# Patient Record
Sex: MALE | Race: WHITE | NOT HISPANIC OR LATINO | Employment: FULL TIME | URBAN - METROPOLITAN AREA
[De-identification: names, ages, dates, MRNs, and addresses within clinical notes are randomized per-mention and may not be internally consistent; named-entity substitution may affect disease eponyms.]

---

## 2023-03-30 RX ORDER — DEXTROAMPHETAMINE SACCHARATE, AMPHETAMINE ASPARTATE, DEXTROAMPHETAMINE SULFATE AND AMPHETAMINE SULFATE 7.5; 7.5; 7.5; 7.5 MG/1; MG/1; MG/1; MG/1
30 TABLET ORAL DAILY
COMMUNITY
Start: 2023-03-03 | End: 2023-04-27 | Stop reason: SDUPTHER

## 2023-03-30 RX ORDER — DEXTROAMPHETAMINE SACCHARATE, AMPHETAMINE ASPARTATE, DEXTROAMPHETAMINE SULFATE AND AMPHETAMINE SULFATE 3.75; 3.75; 3.75; 3.75 MG/1; MG/1; MG/1; MG/1
15 TABLET ORAL DAILY
COMMUNITY
End: 2023-04-27 | Stop reason: SDUPTHER

## 2023-04-27 DIAGNOSIS — F98.8 ATTENTION DEFICIT DISORDER, UNSPECIFIED HYPERACTIVITY PRESENCE: ICD-10-CM

## 2023-04-27 RX ORDER — DEXTROAMPHETAMINE SACCHARATE, AMPHETAMINE ASPARTATE, DEXTROAMPHETAMINE SULFATE AND AMPHETAMINE SULFATE 3.75; 3.75; 3.75; 3.75 MG/1; MG/1; MG/1; MG/1
15 TABLET ORAL DAILY
Qty: 30 TABLET | Refills: 0 | Status: SHIPPED | OUTPATIENT
Start: 2023-04-27 | End: 2023-05-04 | Stop reason: SDUPTHER

## 2023-04-27 RX ORDER — DEXTROAMPHETAMINE SACCHARATE, AMPHETAMINE ASPARTATE, DEXTROAMPHETAMINE SULFATE AND AMPHETAMINE SULFATE 7.5; 7.5; 7.5; 7.5 MG/1; MG/1; MG/1; MG/1
30 TABLET ORAL DAILY
Qty: 30 TABLET | Refills: 0 | Status: SHIPPED | OUTPATIENT
Start: 2023-04-27 | End: 2023-05-04 | Stop reason: SDUPTHER

## 2023-05-04 ENCOUNTER — LAB (OUTPATIENT)
Dept: LAB | Facility: LAB | Age: 58
End: 2023-05-04
Payer: COMMERCIAL

## 2023-05-04 ENCOUNTER — OFFICE VISIT (OUTPATIENT)
Dept: PRIMARY CARE | Facility: CLINIC | Age: 58
End: 2023-05-04
Payer: COMMERCIAL

## 2023-05-04 VITALS
HEART RATE: 82 BPM | BODY MASS INDEX: 33.96 KG/M2 | HEIGHT: 71 IN | DIASTOLIC BLOOD PRESSURE: 80 MMHG | SYSTOLIC BLOOD PRESSURE: 128 MMHG | WEIGHT: 242.6 LBS

## 2023-05-04 DIAGNOSIS — F98.8 ATTENTION DEFICIT DISORDER, UNSPECIFIED HYPERACTIVITY PRESENCE: ICD-10-CM

## 2023-05-04 DIAGNOSIS — F98.8 ATTENTION DEFICIT DISORDER (ADD) IN ADULT: Primary | ICD-10-CM

## 2023-05-04 DIAGNOSIS — F98.8 ATTENTION DEFICIT DISORDER (ADD) IN ADULT: ICD-10-CM

## 2023-05-04 PROCEDURE — 1036F TOBACCO NON-USER: CPT | Performed by: STUDENT IN AN ORGANIZED HEALTH CARE EDUCATION/TRAINING PROGRAM

## 2023-05-04 PROCEDURE — 99213 OFFICE O/P EST LOW 20 MIN: CPT | Performed by: STUDENT IN AN ORGANIZED HEALTH CARE EDUCATION/TRAINING PROGRAM

## 2023-05-04 PROCEDURE — 80324 DRUG SCREEN AMPHETAMINES 1/2: CPT

## 2023-05-04 PROCEDURE — 80307 DRUG TEST PRSMV CHEM ANLYZR: CPT

## 2023-05-04 RX ORDER — DEXTROAMPHETAMINE SACCHARATE, AMPHETAMINE ASPARTATE, DEXTROAMPHETAMINE SULFATE AND AMPHETAMINE SULFATE 7.5; 7.5; 7.5; 7.5 MG/1; MG/1; MG/1; MG/1
30 TABLET ORAL DAILY
Qty: 30 TABLET | Refills: 0 | Status: SHIPPED | OUTPATIENT
Start: 2023-05-04 | End: 2023-08-04 | Stop reason: SDUPTHER

## 2023-05-04 RX ORDER — DEXTROAMPHETAMINE SACCHARATE, AMPHETAMINE ASPARTATE, DEXTROAMPHETAMINE SULFATE AND AMPHETAMINE SULFATE 7.5; 7.5; 7.5; 7.5 MG/1; MG/1; MG/1; MG/1
30 TABLET ORAL DAILY
Qty: 30 TABLET | Refills: 0 | Status: SHIPPED | OUTPATIENT
Start: 2023-06-13 | End: 2023-08-04 | Stop reason: SDUPTHER

## 2023-05-04 RX ORDER — DEXTROAMPHETAMINE SACCHARATE, AMPHETAMINE ASPARTATE, DEXTROAMPHETAMINE SULFATE AND AMPHETAMINE SULFATE 3.75; 3.75; 3.75; 3.75 MG/1; MG/1; MG/1; MG/1
15 TABLET ORAL DAILY
Qty: 30 TABLET | Refills: 0 | Status: SHIPPED | OUTPATIENT
Start: 2023-05-04 | End: 2023-05-31 | Stop reason: SDUPTHER

## 2023-05-04 RX ORDER — DEXTROAMPHETAMINE SACCHARATE, AMPHETAMINE ASPARTATE, DEXTROAMPHETAMINE SULFATE AND AMPHETAMINE SULFATE 3.75; 3.75; 3.75; 3.75 MG/1; MG/1; MG/1; MG/1
15 TABLET ORAL DAILY
Qty: 30 TABLET | Refills: 0 | Status: SHIPPED | OUTPATIENT
Start: 2023-06-13 | End: 2023-08-04 | Stop reason: SDUPTHER

## 2023-05-04 RX ORDER — DEXTROAMPHETAMINE SACCHARATE, AMPHETAMINE ASPARTATE, DEXTROAMPHETAMINE SULFATE AND AMPHETAMINE SULFATE 7.5; 7.5; 7.5; 7.5 MG/1; MG/1; MG/1; MG/1
30 TABLET ORAL DAILY
Qty: 30 TABLET | Refills: 0 | Status: SHIPPED | OUTPATIENT
Start: 2023-07-13 | End: 2023-08-04 | Stop reason: SDUPTHER

## 2023-05-04 RX ORDER — DEXTROAMPHETAMINE SACCHARATE, AMPHETAMINE ASPARTATE, DEXTROAMPHETAMINE SULFATE AND AMPHETAMINE SULFATE 3.75; 3.75; 3.75; 3.75 MG/1; MG/1; MG/1; MG/1
15 TABLET ORAL DAILY
Qty: 30 TABLET | Refills: 0 | Status: SHIPPED | OUTPATIENT
Start: 2023-07-13 | End: 2023-08-04 | Stop reason: SDUPTHER

## 2023-05-04 ASSESSMENT — PATIENT HEALTH QUESTIONNAIRE - PHQ9
2. FEELING DOWN, DEPRESSED OR HOPELESS: NOT AT ALL
1. LITTLE INTEREST OR PLEASURE IN DOING THINGS: NOT AT ALL
SUM OF ALL RESPONSES TO PHQ9 QUESTIONS 1 AND 2: 0

## 2023-05-04 NOTE — PROGRESS NOTES
"Subjective   Patient ID: Raphael Cristina is a 57 y.o. male who presents for ADHD.    HPI   Patient is presenting to the office today to follow-up for his ADHD    He continues to take Adderall 30 mg in the morning and 15 mg in the afternoon    Overall he feels very well with this current medication regiment    His last UDS/CSA was updated in the summer 2022 and he is willing to update at today's visit    Requesting refills to be sent to his local pharmacy    Review of Systems  All pertinent positive symptoms are included in the history of present illness.    All other systems have been reviewed and are negative and noncontributory to this patient's current ailments.    Objective   /80 (BP Location: Left arm, Patient Position: Sitting, BP Cuff Size: Large adult)   Pulse 82   Ht 1.803 m (5' 11\")   Wt 110 kg (242 lb 9.6 oz)   BMI 33.84 kg/m²     Physical Exam  CONSTITUTIONAL - well nourished, well developed, looks like stated age, in no acute distress, not ill-appearing, and not tired appearing  SKIN - normal skin color and pigmentation, normal skin turgor without rash, lesions, or nodules visualized  HEAD - no trauma, normocephalic  EYES - normal external exam  CHEST -no distressed breathing, good effort, heart regular rate and rhythm, no murmurs, rubs, or gallops, lungs clear to auscultation bilaterally  EXTREMITIES - no edema, no deformities  NEUROLOGICAL - normal balance, normal motor, no ataxia  PSYCHIATRIC - alert, pleasant and cordial, age-appropriate    Assessment/Plan   1.  ADHD    Stable.  No changes recommended at this time  We will continue 30 mg in the morning and 15 mg the afternoon    UDS/CSA updated at today's visit    Please follow-up in 3 months for continued care and refills  "

## 2023-05-05 LAB
AMPHETAMINE (PRESENCE) IN URINE BY SCREEN METHOD: ABNORMAL
BARBITURATES PRESENCE IN URINE BY SCREEN METHOD: ABNORMAL
BENZODIAZEPINE (PRESENCE) IN URINE BY SCREEN METHOD: ABNORMAL
CANNABINOIDS IN URINE BY SCREEN METHOD: ABNORMAL
COCAINE (PRESENCE) IN URINE BY SCREEN METHOD: ABNORMAL
DRUG SCREEN COMMENT URINE: ABNORMAL
FENTANYL URINE: ABNORMAL
METHADONE (PRESENCE) IN URINE BY SCREEN METHOD: ABNORMAL
OPIATES (PRESENCE) IN URINE BY SCREEN METHOD: ABNORMAL
OXYCODONE (PRESENCE) IN URINE BY SCREEN METHOD: ABNORMAL
PHENCYCLIDINE (PRESENCE) IN URINE BY SCREEN METHOD: ABNORMAL

## 2023-05-09 LAB
AMPHETAMINES,URINE: 2726 NG/ML
MDA,URINE: <200 NG/ML
MDEA,URINE: <200 NG/ML
MDMA,UR: <200 NG/ML
METHAMPHETAMINE QUANTITATIVE URINE: <200 NG/ML
PHENTERMINE,UR: <200 NG/ML

## 2023-05-31 DIAGNOSIS — F98.8 ATTENTION DEFICIT DISORDER (ADD) IN ADULT: ICD-10-CM

## 2023-05-31 DIAGNOSIS — F98.8 ATTENTION DEFICIT DISORDER, UNSPECIFIED HYPERACTIVITY PRESENCE: ICD-10-CM

## 2023-05-31 RX ORDER — DEXTROAMPHETAMINE SACCHARATE, AMPHETAMINE ASPARTATE, DEXTROAMPHETAMINE SULFATE AND AMPHETAMINE SULFATE 3.75; 3.75; 3.75; 3.75 MG/1; MG/1; MG/1; MG/1
15 TABLET ORAL DAILY
Qty: 30 TABLET | Refills: 0 | Status: SHIPPED | OUTPATIENT
Start: 2023-05-31 | End: 2023-08-04 | Stop reason: SDUPTHER

## 2023-08-03 NOTE — PROGRESS NOTES
"Subjective   Patient ID: Raphael Cristina is a 57 y.o. male who presents for ADHD.  Today he is accompanied by alone.     HPI    1. Attention deficit disorder, unspecified hyperactivity presence  Controlled, taking Adderall 30 mg once daily in AM and 15 mg once daily in afternoon.  Reports increased tolerance to current dosage.   UDS completed last visit (5/4/2023).   CSA to be signed in clinic today.   Denies chest pain, shortness of breath, palpitations, or dizziness.     Current Outpatient Medications on File Prior to Visit   Medication Sig Dispense Refill    amphetamine-dextroamphetamine (Adderall) 15 mg tablet Take 1 tablet (15 mg) by mouth once daily. Do not start before June 13, 2023. 30 tablet 0    amphetamine-dextroamphetamine (Adderall) 15 mg tablet Take 1 tablet (15 mg) by mouth once daily. Do not start before July 13, 2023. 30 tablet 0    amphetamine-dextroamphetamine (Adderall) 15 mg tablet Take 1 tablet (15 mg) by mouth once daily. 30 tablet 0    amphetamine-dextroamphetamine (Adderall) 30 mg tablet Take 1 tablet (30 mg) by mouth once daily. as directed 30 tablet 0    amphetamine-dextroamphetamine (Adderall) 30 mg tablet Take 1 tablet (30 mg) by mouth once daily. as directed Do not start before June 13, 2023. 30 tablet 0    amphetamine-dextroamphetamine (Adderall) 30 mg tablet Take 1 tablet (30 mg) by mouth once daily. as directed Do not start before July 13, 2023. 30 tablet 0     No current facility-administered medications on file prior to visit.        No Known Allergies      There is no immunization history on file for this patient.      Review of Systems  All pertinent positive symptoms are included in the history of present illness.  All other systems have been reviewed and are negative and noncontributory to this patient's current ailments.     Objective   /84 (BP Location: Left arm, Patient Position: Sitting, BP Cuff Size: Adult)   Pulse 84   Ht 1.803 m (5' 11\")   Wt 109 kg (240 lb)   " BMI 33.47 kg/m²   BSA: 2.34 meters squared  No visits with results within 1 Month(s) from this visit.   Latest known visit with results is:   Lab on 05/04/2023   Component Date Value Ref Range Status    DRUG SCREEN COMMENT URINE 05/04/2023 SEE BELOW   Final    Drug screen results are presumptive and should not be used to assess   compliance with prescribed medication. Definitive confirmatory drug testing   has been added to this sample for any positive screen result and will be   reported separately.   .  Toxicology screening results are reported qualitatively. The concentration   must be greater than or equal to the cutoff to be reported as positive. The   concentration at which the screening test can detect an individual drug or   metabolite varies. The absence of expected drug(s) and/or drug metabolite(s)   may indicate non-compliance, inappropriate timing of specimen collection   relative to drug administration, poor drug absorption, diluted/adulterated   urine, or limitations of testing. For medical purposes only; not valid for   forensic use.   .  Interpretive questions should be directed to the laboratory medical   directors.      Amphetamine Screen, Urine 05/04/2023 PRESUMPTIVE POSITIVE (A)  NEGATIVE Final     CUTOFF LEVEL:  500 NG/ML   Cross-reactivity has been reported with high concentrations   of the following drugs: buproprion, chloroquine, chlorpromazine,   ephedrine, mephentermine, fenfluramine, phentermine,   phenylpropanolamine, pseudoephedrine, and propranolol.    Barbiturate Screen, Urine 05/04/2023 PRESUMPTIVE NEGATIVE  NEGATIVE Final     CUTOFF LEVEL: 200 NG/ML    BENZODIAZEPINE (PRESENCE) IN URINE* 05/04/2023 PRESUMPTIVE NEGATIVE  NEGATIVE Final     CUTOFF LEVEL: 200 NG/ML    Cannabinoid Screen, Urine 05/04/2023 PRESUMPTIVE NEGATIVE  NEGATIVE Final     CUTOFF LEVEL: 50 NG/ML    Cocaine Screen, Urine 05/04/2023 PRESUMPTIVE NEGATIVE  NEGATIVE Final     CUTOFF LEVEL: 150 NG/ML    Fentanyl, Ur  05/04/2023 PRESUMPTIVE NEGATIVE  NEGATIVE Final     CUTOFF LEVEL:  5 NG/ML    Methadone Screen, Urine 05/04/2023 PRESUMPTIVE NEGATIVE  NEGATIVE Final     CUTOFF LEVEL: 150 NG/ML   The metabolite L-alpha-acetylmethadol (LAAM) is not   detected by this method in concentrations that would   be found in the urine of patients on LAAM therapy.    Opiate Screen, Urine 05/04/2023 PRESUMPTIVE NEGATIVE  NEGATIVE Final     CUTOFF LEVEL: 300 NG/ML   The opiate screen does not detect fentanyl, meperidine, or    tramadol. Oxycodone is not consistently detected (refer to   Oxycodone Screen, Urine result).    Oxycodone Screen, Ur 05/04/2023 PRESUMPTIVE NEGATIVE  NEGATIVE Final     CUTOFF LEVEL: 100 NG/ML    This test will accurately detect both oxycodone and oxymorphone.         PCP Screen, Urine 05/04/2023 PRESUMPTIVE NEGATIVE  NEGATIVE Final     CUTOFF LEVEL:  25 NG/ML   Cross-reactivity has been reported with dextromethorphan.    Methamphetamine Quant, Ur 05/04/2023 <200  ng/mL Final    MDA, Urine 05/04/2023 <200  ng/mL Final    MDEA, Urine 05/04/2023 <200  ng/mL Final    Phentermine,Urine 05/04/2023 <200  ng/mL Final    Performed By: [facility]  [address] [place] [id number]  : Tyrone Carvalho MD, PhD    Amphetamines,Urine 05/04/2023 2726  ng/mL Final    Comment: Consistent with use of a drug containing amphetamine. May also   reflect metabolism of methamphetamine, when methamphetamine is   present.  Amphetamine and methamphetamine exist in d- and   l-isomeric forms.  These forms are not distinguished by this test.    Isomeric separation is available separately for an additional   charge.  INTERPRETIVE INFORMATION: Amphetamines, Urine,                             Quantitative  Methodology: Quantitative Liquid Chromatography-Tandem Mass   Spectrometry  Positive cutoff: 200 ng/mL unless specified below:  Amphetamine     50 ng/mL  For medical purposes only; not valid for forensic use.    The absence of expected drug(s) and/or drug metabolite(s) may   indicate non-compliance, inappropriate timing of specimen   collection relative to drug administration, poor drug absorption,   diluted/adulterated urine, or limitations of testing. The   concentration value must be greater than or equal to the cutoff to   be reported as positive. Interpretive                            questions should be directed   to the laboratory.  This test was developed and its performance characteristics   determined by [facility]. It has not been cleared or   approved by the [place] Food and Drug Administration. This test was   performed in a CLIA certified laboratory and is intended for   clinical purposes.    MDMA, Urine 05/04/2023 <200  ng/mL Final       Physical Exam  CONSTITUTIONAL - well nourished, well developed, looks like stated age, in no acute distress, not ill-appearing, and not tired appearing  SKIN - normal skin color and pigmentation, normal skin turgor without rash, lesions, or nodules visualized  HEAD - no trauma, normocephalic  EYES - normal external exam  CHEST - clear to auscultation, no wheezing, no crackles and no rales, good effort  CARDIAC - regular rate and regular rhythm, no skipped beats, no murmur  EXTREMITIES - no edema, no deformities  NEUROLOGICAL - normal balance, normal motor, no ataxia  PSYCHIATRIC - alert, pleasant and cordial, age-appropriate    Assessment/Plan   1. Attention deficit disorder, unspecified hyperactivity presence  Controlled, refills of both your Adderall 30 mg QAM and 15 mg QPM sent to pharmacy.   Discussed cardiovascular risk with increasing stimulant dosage.   Also, discussed alternative treatment options including switching to Vyvanse.   UDS completed last visit (5/4/2023).   CSA reviewed and signed today (8/4/2023).

## 2023-08-04 ENCOUNTER — OFFICE VISIT (OUTPATIENT)
Dept: PRIMARY CARE | Facility: CLINIC | Age: 58
End: 2023-08-04
Payer: COMMERCIAL

## 2023-08-04 VITALS
BODY MASS INDEX: 33.6 KG/M2 | HEART RATE: 84 BPM | HEIGHT: 71 IN | SYSTOLIC BLOOD PRESSURE: 122 MMHG | DIASTOLIC BLOOD PRESSURE: 84 MMHG | WEIGHT: 240 LBS

## 2023-08-04 DIAGNOSIS — F98.8 ATTENTION DEFICIT DISORDER, UNSPECIFIED HYPERACTIVITY PRESENCE: Primary | ICD-10-CM

## 2023-08-04 DIAGNOSIS — F98.8 ATTENTION DEFICIT DISORDER (ADD) IN ADULT: ICD-10-CM

## 2023-08-04 PROCEDURE — 1036F TOBACCO NON-USER: CPT | Performed by: STUDENT IN AN ORGANIZED HEALTH CARE EDUCATION/TRAINING PROGRAM

## 2023-08-04 PROCEDURE — 99213 OFFICE O/P EST LOW 20 MIN: CPT | Performed by: STUDENT IN AN ORGANIZED HEALTH CARE EDUCATION/TRAINING PROGRAM

## 2023-08-04 RX ORDER — DEXTROAMPHETAMINE SACCHARATE, AMPHETAMINE ASPARTATE, DEXTROAMPHETAMINE SULFATE AND AMPHETAMINE SULFATE 3.75; 3.75; 3.75; 3.75 MG/1; MG/1; MG/1; MG/1
15 TABLET ORAL DAILY
Qty: 30 TABLET | Refills: 0 | Status: SHIPPED | OUTPATIENT
Start: 2023-09-23 | End: 2023-11-02 | Stop reason: SDUPTHER

## 2023-08-04 RX ORDER — DEXTROAMPHETAMINE SACCHARATE, AMPHETAMINE ASPARTATE, DEXTROAMPHETAMINE SULFATE AND AMPHETAMINE SULFATE 7.5; 7.5; 7.5; 7.5 MG/1; MG/1; MG/1; MG/1
30 TABLET ORAL DAILY
Qty: 30 TABLET | Refills: 0 | Status: SHIPPED | OUTPATIENT
Start: 2023-08-24 | End: 2023-11-02 | Stop reason: SDUPTHER

## 2023-08-04 RX ORDER — DEXTROAMPHETAMINE SACCHARATE, AMPHETAMINE ASPARTATE, DEXTROAMPHETAMINE SULFATE AND AMPHETAMINE SULFATE 3.75; 3.75; 3.75; 3.75 MG/1; MG/1; MG/1; MG/1
15 TABLET ORAL DAILY
Qty: 30 TABLET | Refills: 0 | Status: SHIPPED | OUTPATIENT
Start: 2023-10-23 | End: 2023-11-02 | Stop reason: SDUPTHER

## 2023-08-04 RX ORDER — DEXTROAMPHETAMINE SACCHARATE, AMPHETAMINE ASPARTATE, DEXTROAMPHETAMINE SULFATE AND AMPHETAMINE SULFATE 7.5; 7.5; 7.5; 7.5 MG/1; MG/1; MG/1; MG/1
30 TABLET ORAL DAILY
Qty: 30 TABLET | Refills: 0 | Status: SHIPPED | OUTPATIENT
Start: 2023-10-23 | End: 2023-11-02 | Stop reason: SDUPTHER

## 2023-08-04 RX ORDER — DEXTROAMPHETAMINE SACCHARATE, AMPHETAMINE ASPARTATE, DEXTROAMPHETAMINE SULFATE AND AMPHETAMINE SULFATE 3.75; 3.75; 3.75; 3.75 MG/1; MG/1; MG/1; MG/1
15 TABLET ORAL DAILY
Qty: 30 TABLET | Refills: 0 | Status: SHIPPED | OUTPATIENT
Start: 2023-08-24 | End: 2023-08-28 | Stop reason: SDUPTHER

## 2023-08-04 RX ORDER — DEXTROAMPHETAMINE SACCHARATE, AMPHETAMINE ASPARTATE, DEXTROAMPHETAMINE SULFATE AND AMPHETAMINE SULFATE 7.5; 7.5; 7.5; 7.5 MG/1; MG/1; MG/1; MG/1
30 TABLET ORAL DAILY
Qty: 30 TABLET | Refills: 0 | Status: SHIPPED | OUTPATIENT
Start: 2023-09-23 | End: 2023-11-02 | Stop reason: SDUPTHER

## 2023-08-08 ENCOUNTER — TELEPHONE (OUTPATIENT)
Dept: PRIMARY CARE | Facility: CLINIC | Age: 58
End: 2023-08-08
Payer: COMMERCIAL

## 2023-08-08 DIAGNOSIS — F98.8 ATTENTION DEFICIT DISORDER (ADD) IN ADULT: ICD-10-CM

## 2023-08-08 DIAGNOSIS — F98.8 ATTENTION DEFICIT DISORDER, UNSPECIFIED HYPERACTIVITY PRESENCE: Primary | ICD-10-CM

## 2023-08-08 RX ORDER — LISDEXAMFETAMINE DIMESYLATE 40 MG/1
40 CAPSULE ORAL EVERY MORNING
Qty: 30 CAPSULE | Refills: 0 | Status: SHIPPED | OUTPATIENT
Start: 2023-08-08 | End: 2023-08-24 | Stop reason: SDUPTHER

## 2023-08-08 NOTE — TELEPHONE ENCOUNTER
----- Message from Genaro Brar DO sent at 8/8/2023  8:59 AM EDT -----  I can easily try him on the Vyvanse but I would not feel comfortable going at that dosage    I know the maximum dosage on the label is 70 mg a day    If his son is truly on 90 mg a day I do not feel comfortable providing that for the patient and I would almost recommend that if he would like to try to go that high or with an intensive going that high he might have to see psychiatry    I can easily start him on 40/50 mg to see how he does    Please advise but he has to know I would not go above 70  ----- Message -----  From: Blanca Das MA  Sent: 8/8/2023   8:36 AM EDT  To: Genaro Brar DO

## 2023-08-08 NOTE — TELEPHONE ENCOUNTER
Pt called with the information about how his son is prescribed Vyvanse. He takes 70mg in the morning and 20mg in the evening. He stated we were going to prescribe based on this information.

## 2023-08-24 DIAGNOSIS — F98.8 ATTENTION DEFICIT DISORDER, UNSPECIFIED HYPERACTIVITY PRESENCE: ICD-10-CM

## 2023-08-24 DIAGNOSIS — F98.8 ATTENTION DEFICIT DISORDER (ADD) IN ADULT: ICD-10-CM

## 2023-08-27 RX ORDER — LISDEXAMFETAMINE DIMESYLATE 40 MG/1
40 CAPSULE ORAL EVERY MORNING
Qty: 30 CAPSULE | Refills: 0 | Status: SHIPPED | OUTPATIENT
Start: 2023-08-27 | End: 2023-09-21 | Stop reason: ALTCHOICE

## 2023-08-28 DIAGNOSIS — F98.8 ATTENTION DEFICIT DISORDER (ADD) IN ADULT: ICD-10-CM

## 2023-08-28 DIAGNOSIS — F98.8 ATTENTION DEFICIT DISORDER, UNSPECIFIED HYPERACTIVITY PRESENCE: ICD-10-CM

## 2023-08-28 RX ORDER — DEXTROAMPHETAMINE SACCHARATE, AMPHETAMINE ASPARTATE, DEXTROAMPHETAMINE SULFATE AND AMPHETAMINE SULFATE 3.75; 3.75; 3.75; 3.75 MG/1; MG/1; MG/1; MG/1
15 TABLET ORAL DAILY
Qty: 30 TABLET | Refills: 0 | Status: SHIPPED | OUTPATIENT
Start: 2023-08-28 | End: 2023-11-02 | Stop reason: SDUPTHER

## 2023-09-21 ENCOUNTER — TELEPHONE (OUTPATIENT)
Dept: PRIMARY CARE | Facility: CLINIC | Age: 58
End: 2023-09-21
Payer: COMMERCIAL

## 2023-09-21 DIAGNOSIS — F98.8 ATTENTION DEFICIT DISORDER, UNSPECIFIED HYPERACTIVITY PRESENCE: ICD-10-CM

## 2023-09-21 RX ORDER — LISDEXAMFETAMINE DIMESYLATE 50 MG/1
50 CAPSULE ORAL EVERY MORNING
Qty: 30 CAPSULE | Refills: 0 | Status: SHIPPED | OUTPATIENT
Start: 2023-09-21 | End: 2023-10-03 | Stop reason: SDUPTHER

## 2023-09-21 NOTE — TELEPHONE ENCOUNTER
Pt is read for his next fill of vyvanse ( he was able to get it covered), he is wondering if he can get bumped up in dosage and possibly do a 40mg Rx and 30mg Rx or whatever you think is best to increase. He states he doesn't notice much of a difference just at 40mg.

## 2023-10-03 DIAGNOSIS — F98.8 ATTENTION DEFICIT DISORDER, UNSPECIFIED HYPERACTIVITY PRESENCE: ICD-10-CM

## 2023-10-03 RX ORDER — LISDEXAMFETAMINE DIMESYLATE 50 MG/1
50 CAPSULE ORAL EVERY MORNING
Qty: 30 CAPSULE | Refills: 0 | Status: SHIPPED | OUTPATIENT
Start: 2023-10-03 | End: 2023-11-02

## 2023-11-02 ENCOUNTER — OFFICE VISIT (OUTPATIENT)
Dept: PRIMARY CARE | Facility: CLINIC | Age: 58
End: 2023-11-02
Payer: COMMERCIAL

## 2023-11-02 VITALS
DIASTOLIC BLOOD PRESSURE: 80 MMHG | HEIGHT: 71 IN | WEIGHT: 243.6 LBS | BODY MASS INDEX: 34.1 KG/M2 | HEART RATE: 90 BPM | SYSTOLIC BLOOD PRESSURE: 124 MMHG

## 2023-11-02 DIAGNOSIS — F98.8 ATTENTION DEFICIT DISORDER (ADD) IN ADULT: ICD-10-CM

## 2023-11-02 DIAGNOSIS — F98.8 ATTENTION DEFICIT DISORDER, UNSPECIFIED HYPERACTIVITY PRESENCE: ICD-10-CM

## 2023-11-02 PROCEDURE — 99213 OFFICE O/P EST LOW 20 MIN: CPT | Performed by: STUDENT IN AN ORGANIZED HEALTH CARE EDUCATION/TRAINING PROGRAM

## 2023-11-02 PROCEDURE — 1036F TOBACCO NON-USER: CPT | Performed by: STUDENT IN AN ORGANIZED HEALTH CARE EDUCATION/TRAINING PROGRAM

## 2023-11-02 RX ORDER — DEXTROAMPHETAMINE SACCHARATE, AMPHETAMINE ASPARTATE, DEXTROAMPHETAMINE SULFATE AND AMPHETAMINE SULFATE 3.75; 3.75; 3.75; 3.75 MG/1; MG/1; MG/1; MG/1
15 TABLET ORAL DAILY
Qty: 30 TABLET | Refills: 0 | Status: SHIPPED | OUTPATIENT
Start: 2023-11-02 | End: 2024-02-09 | Stop reason: SDUPTHER

## 2023-11-02 RX ORDER — DEXTROAMPHETAMINE SACCHARATE, AMPHETAMINE ASPARTATE, DEXTROAMPHETAMINE SULFATE AND AMPHETAMINE SULFATE 7.5; 7.5; 7.5; 7.5 MG/1; MG/1; MG/1; MG/1
30 TABLET ORAL DAILY
Qty: 30 TABLET | Refills: 0 | Status: SHIPPED | OUTPATIENT
Start: 2023-11-02 | End: 2023-12-04 | Stop reason: SDUPTHER

## 2023-11-02 RX ORDER — LISDEXAMFETAMINE DIMESYLATE 50 MG/1
50 CAPSULE ORAL EVERY MORNING
Qty: 30 CAPSULE | Refills: 0 | Status: CANCELLED | OUTPATIENT
Start: 2023-11-02 | End: 2023-12-02

## 2023-11-02 RX ORDER — DEXTROAMPHETAMINE SACCHARATE, AMPHETAMINE ASPARTATE, DEXTROAMPHETAMINE SULFATE AND AMPHETAMINE SULFATE 3.75; 3.75; 3.75; 3.75 MG/1; MG/1; MG/1; MG/1
15 TABLET ORAL DAILY
Qty: 30 TABLET | Refills: 0 | Status: SHIPPED | OUTPATIENT
Start: 2024-01-01 | End: 2024-02-09 | Stop reason: SDUPTHER

## 2023-11-02 RX ORDER — DEXTROAMPHETAMINE SACCHARATE, AMPHETAMINE ASPARTATE, DEXTROAMPHETAMINE SULFATE AND AMPHETAMINE SULFATE 3.75; 3.75; 3.75; 3.75 MG/1; MG/1; MG/1; MG/1
15 TABLET ORAL DAILY
Qty: 30 TABLET | Refills: 0 | Status: SHIPPED | OUTPATIENT
Start: 2023-12-02 | End: 2024-01-31 | Stop reason: SDUPTHER

## 2023-11-02 RX ORDER — DEXTROAMPHETAMINE SACCHARATE, AMPHETAMINE ASPARTATE, DEXTROAMPHETAMINE SULFATE AND AMPHETAMINE SULFATE 7.5; 7.5; 7.5; 7.5 MG/1; MG/1; MG/1; MG/1
30 TABLET ORAL DAILY
Qty: 30 TABLET | Refills: 0 | Status: SHIPPED | OUTPATIENT
Start: 2023-12-02 | End: 2024-01-03 | Stop reason: SDUPTHER

## 2023-11-02 RX ORDER — DEXTROAMPHETAMINE SACCHARATE, AMPHETAMINE ASPARTATE, DEXTROAMPHETAMINE SULFATE AND AMPHETAMINE SULFATE 7.5; 7.5; 7.5; 7.5 MG/1; MG/1; MG/1; MG/1
30 TABLET ORAL DAILY
Qty: 30 TABLET | Refills: 0 | Status: SHIPPED | OUTPATIENT
Start: 2024-01-01 | End: 2024-02-09 | Stop reason: SDUPTHER

## 2023-11-02 ASSESSMENT — PATIENT HEALTH QUESTIONNAIRE - PHQ9
1. LITTLE INTEREST OR PLEASURE IN DOING THINGS: NOT AT ALL
SUM OF ALL RESPONSES TO PHQ9 QUESTIONS 1 AND 2: 0
2. FEELING DOWN, DEPRESSED OR HOPELESS: NOT AT ALL

## 2023-11-02 NOTE — PROGRESS NOTES
Subjective   Patient ID: Raphael Cristina is a 57 y.o. male who presents for ADHD.  Today he is accompanied by alone.     HPI    1. Attention deficit disorder, unspecified hyperactivity presence  In the past it was controlled, taking Adderall 30 mg once daily in AM and 15 mg once daily in afternoon.  Ultimately he was try to go onto Vyvanse that we started at a lower dosage and ultimately was at 40 mg  There was a prescription that was sent that was 50 mg early last month but it appears that he filled his Adderall once again  Ultimately stated that the Vyvanse was too much money and he feels well with the Adderall  Wishes to continue Adderall if appropriate    UDS completed last visit (5/4/2023).   CSA updated as well  Denies chest pain, shortness of breath, palpitations, or dizziness.     Current Outpatient Medications on File Prior to Visit   Medication Sig Dispense Refill    amphetamine-dextroamphetamine (Adderall) 15 mg tablet Take 1 tablet (15 mg) by mouth once daily. Do not start before September 23, 2023. 30 tablet 0    amphetamine-dextroamphetamine (Adderall) 15 mg tablet Take 1 tablet (15 mg) by mouth once daily. Do not start before October 23, 2023. 30 tablet 0    amphetamine-dextroamphetamine (Adderall) 15 mg tablet Take 1 tablet (15 mg) by mouth once daily. 30 tablet 0    amphetamine-dextroamphetamine (Adderall) 30 mg tablet Take 1 tablet (30 mg) by mouth once daily. as directed Do not start before August 24, 2023. 30 tablet 0    amphetamine-dextroamphetamine (Adderall) 30 mg tablet Take 1 tablet (30 mg) by mouth once daily. as directed Do not start before September 23, 2023. 30 tablet 0    amphetamine-dextroamphetamine (Adderall) 30 mg tablet Take 1 tablet (30 mg) by mouth once daily. as directed Do not start before October 23, 2023. 30 tablet 0    [DISCONTINUED] lisdexamfetamine (Vyvanse) 50 mg capsule Take 1 capsule (50 mg) by mouth once daily in the morning. 30 capsule 0     No current  "facility-administered medications on file prior to visit.        No Known Allergies      There is no immunization history on file for this patient.      Review of Systems  All pertinent positive symptoms are included in the history of present illness.  All other systems have been reviewed and are negative and noncontributory to this patient's current ailments.     Objective   /80 (BP Location: Left arm, Patient Position: Sitting, BP Cuff Size: Large adult)   Pulse 90   Ht 1.803 m (5' 11\")   Wt 110 kg (243 lb 9.6 oz)   BMI 33.98 kg/m²   BSA: 2.35 meters squared  No visits with results within 1 Month(s) from this visit.   Latest known visit with results is:   Lab on 05/04/2023   Component Date Value Ref Range Status    DRUG SCREEN COMMENT URINE 05/04/2023 SEE BELOW   Final    Drug screen results are presumptive and should not be used to assess   compliance with prescribed medication. Definitive confirmatory drug testing   has been added to this sample for any positive screen result and will be   reported separately.   .  Toxicology screening results are reported qualitatively. The concentration   must be greater than or equal to the cutoff to be reported as positive. The   concentration at which the screening test can detect an individual drug or   metabolite varies. The absence of expected drug(s) and/or drug metabolite(s)   may indicate non-compliance, inappropriate timing of specimen collection   relative to drug administration, poor drug absorption, diluted/adulterated   urine, or limitations of testing. For medical purposes only; not valid for   forensic use.   .  Interpretive questions should be directed to the laboratory medical   directors.      Amphetamine Screen, Urine 05/04/2023 PRESUMPTIVE POSITIVE (A)  NEGATIVE Final     CUTOFF LEVEL:  500 NG/ML   Cross-reactivity has been reported with high concentrations   of the following drugs: buproprion, chloroquine, chlorpromazine,   ephedrine, " mephentermine, fenfluramine, phentermine,   phenylpropanolamine, pseudoephedrine, and propranolol.    Barbiturate Screen, Urine 05/04/2023 PRESUMPTIVE NEGATIVE  NEGATIVE Final     CUTOFF LEVEL: 200 NG/ML    BENZODIAZEPINE (PRESENCE) IN URINE* 05/04/2023 PRESUMPTIVE NEGATIVE  NEGATIVE Final     CUTOFF LEVEL: 200 NG/ML    Cannabinoid Screen, Urine 05/04/2023 PRESUMPTIVE NEGATIVE  NEGATIVE Final     CUTOFF LEVEL: 50 NG/ML    Cocaine Screen, Urine 05/04/2023 PRESUMPTIVE NEGATIVE  NEGATIVE Final     CUTOFF LEVEL: 150 NG/ML    Fentanyl, Ur 05/04/2023 PRESUMPTIVE NEGATIVE  NEGATIVE Final     CUTOFF LEVEL:  5 NG/ML    Methadone Screen, Urine 05/04/2023 PRESUMPTIVE NEGATIVE  NEGATIVE Final     CUTOFF LEVEL: 150 NG/ML   The metabolite L-alpha-acetylmethadol (LAAM) is not   detected by this method in concentrations that would   be found in the urine of patients on LAAM therapy.    Opiate Screen, Urine 05/04/2023 PRESUMPTIVE NEGATIVE  NEGATIVE Final     CUTOFF LEVEL: 300 NG/ML   The opiate screen does not detect fentanyl, meperidine, or    tramadol. Oxycodone is not consistently detected (refer to   Oxycodone Screen, Urine result).    Oxycodone Screen, Ur 05/04/2023 PRESUMPTIVE NEGATIVE  NEGATIVE Final     CUTOFF LEVEL: 100 NG/ML    This test will accurately detect both oxycodone and oxymorphone.         PCP Screen, Urine 05/04/2023 PRESUMPTIVE NEGATIVE  NEGATIVE Final     CUTOFF LEVEL:  25 NG/ML   Cross-reactivity has been reported with dextromethorphan.    Methamphetamine Quant, Ur 05/04/2023 <200  ng/mL Final    MDA, Urine 05/04/2023 <200  ng/mL Final    MDEA, Urine 05/04/2023 <200  ng/mL Final    Phentermine,Urine 05/04/2023 <200  ng/mL Final    Performed By: [facility]  [address] [place] [id number]  : Tyrone Carvalho MD, PhD    Amphetamines,Urine 05/04/2023 2726  ng/mL Final    Comment: Consistent with use of a drug containing amphetamine. May also   reflect metabolism of  methamphetamine, when methamphetamine is   present.  Amphetamine and methamphetamine exist in d- and   l-isomeric forms.  These forms are not distinguished by this test.    Isomeric separation is available separately for an additional   charge.  INTERPRETIVE INFORMATION: Amphetamines, Urine,                             Quantitative  Methodology: Quantitative Liquid Chromatography-Tandem Mass   Spectrometry  Positive cutoff: 200 ng/mL unless specified below:  Amphetamine     50 ng/mL  For medical purposes only; not valid for forensic use.   The absence of expected drug(s) and/or drug metabolite(s) may   indicate non-compliance, inappropriate timing of specimen   collection relative to drug administration, poor drug absorption,   diluted/adulterated urine, or limitations of testing. The   concentration value must be greater than or equal to the cutoff to   be reported as positive. Interpretive                            questions should be directed   to the laboratory.  This test was developed and its performance characteristics   determined by [facility]. It has not been cleared or   approved by the [place] Food and Drug Administration. This test was   performed in a CLIA certified laboratory and is intended for   clinical purposes.    MDMA, Urine 05/04/2023 <200  ng/mL Final       Physical Exam  CONSTITUTIONAL - well nourished, well developed, looks like stated age, in no acute distress, not ill-appearing, and not tired appearing  SKIN - normal skin color and pigmentation, normal skin turgor without rash, lesions, or nodules visualized  HEAD - no trauma, normocephalic  EYES - normal external exam  CHEST - clear to auscultation, no wheezing, no crackles and no rales, good effort  CARDIAC - regular rate and regular rhythm, no skipped beats, no murmur  EXTREMITIES - no edema, no deformities  NEUROLOGICAL - normal balance, normal motor, no ataxia  PSYCHIATRIC - alert, pleasant and cordial,  age-appropriate    Assessment/Plan   1. Attention deficit disorder, unspecified hyperactivity presence  Controlled, refills of both your Adderall 30 mg QAM and 15 mg QPM sent to pharmacy.   Discussed cardiovascular risk with increasing stimulant dosage.   Stable without any changes recommended at this time  UDS completed last visit (5/4/2023).   CSA reviewed and signed today (8/4/2023).    Please follow-up in 3 months for continued care as well as your physical examination

## 2023-12-04 DIAGNOSIS — F98.8 ATTENTION DEFICIT DISORDER, UNSPECIFIED HYPERACTIVITY PRESENCE: ICD-10-CM

## 2023-12-04 DIAGNOSIS — F98.8 ATTENTION DEFICIT DISORDER (ADD) IN ADULT: ICD-10-CM

## 2023-12-04 RX ORDER — DEXTROAMPHETAMINE SACCHARATE, AMPHETAMINE ASPARTATE, DEXTROAMPHETAMINE SULFATE AND AMPHETAMINE SULFATE 7.5; 7.5; 7.5; 7.5 MG/1; MG/1; MG/1; MG/1
30 TABLET ORAL DAILY
Qty: 30 TABLET | Refills: 0 | Status: SHIPPED | OUTPATIENT
Start: 2023-12-04 | End: 2024-01-31 | Stop reason: SDUPTHER

## 2024-01-03 DIAGNOSIS — F98.8 ATTENTION DEFICIT DISORDER (ADD) IN ADULT: ICD-10-CM

## 2024-01-03 DIAGNOSIS — F98.8 ATTENTION DEFICIT DISORDER, UNSPECIFIED HYPERACTIVITY PRESENCE: ICD-10-CM

## 2024-01-03 RX ORDER — DEXTROAMPHETAMINE SACCHARATE, AMPHETAMINE ASPARTATE, DEXTROAMPHETAMINE SULFATE AND AMPHETAMINE SULFATE 7.5; 7.5; 7.5; 7.5 MG/1; MG/1; MG/1; MG/1
30 TABLET ORAL DAILY
Qty: 30 TABLET | Refills: 0 | Status: SHIPPED | OUTPATIENT
Start: 2024-01-03 | End: 2024-02-09 | Stop reason: SDUPTHER

## 2024-01-31 DIAGNOSIS — F98.8 ATTENTION DEFICIT DISORDER (ADD) IN ADULT: ICD-10-CM

## 2024-01-31 DIAGNOSIS — F98.8 ATTENTION DEFICIT DISORDER, UNSPECIFIED HYPERACTIVITY PRESENCE: ICD-10-CM

## 2024-01-31 RX ORDER — DEXTROAMPHETAMINE SACCHARATE, AMPHETAMINE ASPARTATE, DEXTROAMPHETAMINE SULFATE AND AMPHETAMINE SULFATE 3.75; 3.75; 3.75; 3.75 MG/1; MG/1; MG/1; MG/1
15 TABLET ORAL DAILY
Qty: 30 TABLET | Refills: 0 | Status: SHIPPED | OUTPATIENT
Start: 2024-01-31 | End: 2024-02-09 | Stop reason: SDUPTHER

## 2024-01-31 RX ORDER — DEXTROAMPHETAMINE SACCHARATE, AMPHETAMINE ASPARTATE, DEXTROAMPHETAMINE SULFATE AND AMPHETAMINE SULFATE 7.5; 7.5; 7.5; 7.5 MG/1; MG/1; MG/1; MG/1
30 TABLET ORAL DAILY
Qty: 30 TABLET | Refills: 0 | Status: SHIPPED | OUTPATIENT
Start: 2024-01-31 | End: 2024-02-09 | Stop reason: SDUPTHER

## 2024-02-09 ENCOUNTER — LAB (OUTPATIENT)
Dept: LAB | Facility: LAB | Age: 59
End: 2024-02-09
Payer: COMMERCIAL

## 2024-02-09 ENCOUNTER — OFFICE VISIT (OUTPATIENT)
Dept: PRIMARY CARE | Facility: CLINIC | Age: 59
End: 2024-02-09
Payer: COMMERCIAL

## 2024-02-09 VITALS
BODY MASS INDEX: 32.62 KG/M2 | SYSTOLIC BLOOD PRESSURE: 126 MMHG | HEIGHT: 71 IN | HEART RATE: 88 BPM | DIASTOLIC BLOOD PRESSURE: 80 MMHG | WEIGHT: 233 LBS

## 2024-02-09 DIAGNOSIS — F98.8 ATTENTION DEFICIT DISORDER (ADD) IN ADULT: ICD-10-CM

## 2024-02-09 DIAGNOSIS — Z00.00 HEALTHCARE MAINTENANCE: ICD-10-CM

## 2024-02-09 DIAGNOSIS — F98.8 ATTENTION DEFICIT DISORDER, UNSPECIFIED HYPERACTIVITY PRESENCE: ICD-10-CM

## 2024-02-09 DIAGNOSIS — Z00.00 HEALTHCARE MAINTENANCE: Primary | ICD-10-CM

## 2024-02-09 LAB
ALBUMIN SERPL BCP-MCNC: 3.9 G/DL (ref 3.4–5)
ALP SERPL-CCNC: 39 U/L (ref 33–120)
ALT SERPL W P-5'-P-CCNC: 58 U/L (ref 10–52)
AMPHETAMINES UR QL SCN: ABNORMAL
ANION GAP SERPL CALC-SCNC: 11 MMOL/L (ref 10–20)
AST SERPL W P-5'-P-CCNC: 41 U/L (ref 9–39)
BARBITURATES UR QL SCN: ABNORMAL
BASOPHILS # BLD AUTO: 0.07 X10*3/UL (ref 0–0.1)
BASOPHILS NFR BLD AUTO: 1.4 %
BENZODIAZ UR QL SCN: ABNORMAL
BILIRUB SERPL-MCNC: 0.4 MG/DL (ref 0–1.2)
BUN SERPL-MCNC: 19 MG/DL (ref 6–23)
BZE UR QL SCN: ABNORMAL
CALCIUM SERPL-MCNC: 8.6 MG/DL (ref 8.6–10.3)
CANNABINOIDS UR QL SCN: ABNORMAL
CHLORIDE SERPL-SCNC: 103 MMOL/L (ref 98–107)
CHOLEST SERPL-MCNC: 141 MG/DL (ref 0–199)
CHOLESTEROL/HDL RATIO: 3.6
CO2 SERPL-SCNC: 26 MMOL/L (ref 21–32)
CREAT SERPL-MCNC: 0.83 MG/DL (ref 0.5–1.3)
EGFRCR SERPLBLD CKD-EPI 2021: >90 ML/MIN/1.73M*2
EOSINOPHIL # BLD AUTO: 0.31 X10*3/UL (ref 0–0.7)
EOSINOPHIL NFR BLD AUTO: 6.2 %
ERYTHROCYTE [DISTWIDTH] IN BLOOD BY AUTOMATED COUNT: 13.4 % (ref 11.5–14.5)
FENTANYL+NORFENTANYL UR QL SCN: ABNORMAL
GLUCOSE SERPL-MCNC: 77 MG/DL (ref 74–99)
HCT VFR BLD AUTO: 51.1 % (ref 41–52)
HDLC SERPL-MCNC: 38.8 MG/DL
HGB BLD-MCNC: 16.9 G/DL (ref 13.5–17.5)
IMM GRANULOCYTES # BLD AUTO: 0.01 X10*3/UL (ref 0–0.7)
IMM GRANULOCYTES NFR BLD AUTO: 0.2 % (ref 0–0.9)
LDLC SERPL CALC-MCNC: 84 MG/DL
LYMPHOCYTES # BLD AUTO: 1.44 X10*3/UL (ref 1.2–4.8)
LYMPHOCYTES NFR BLD AUTO: 28.7 %
MCH RBC QN AUTO: 29.8 PG (ref 26–34)
MCHC RBC AUTO-ENTMCNC: 33.1 G/DL (ref 32–36)
MCV RBC AUTO: 90 FL (ref 80–100)
MONOCYTES # BLD AUTO: 0.63 X10*3/UL (ref 0.1–1)
MONOCYTES NFR BLD AUTO: 12.5 %
NEUTROPHILS # BLD AUTO: 2.56 X10*3/UL (ref 1.2–7.7)
NEUTROPHILS NFR BLD AUTO: 51 %
NON HDL CHOLESTEROL: 102 MG/DL (ref 0–149)
NRBC BLD-RTO: 0 /100 WBCS (ref 0–0)
OPIATES UR QL SCN: ABNORMAL
OXYCODONE+OXYMORPHONE UR QL SCN: ABNORMAL
PCP UR QL SCN: ABNORMAL
PLATELET # BLD AUTO: 241 X10*3/UL (ref 150–450)
POTASSIUM SERPL-SCNC: 4 MMOL/L (ref 3.5–5.3)
PROT SERPL-MCNC: 6.5 G/DL (ref 6.4–8.2)
PSA SERPL-MCNC: 0.3 NG/ML
RBC # BLD AUTO: 5.67 X10*6/UL (ref 4.5–5.9)
SODIUM SERPL-SCNC: 136 MMOL/L (ref 136–145)
TRIGL SERPL-MCNC: 93 MG/DL (ref 0–149)
TSH SERPL-ACNC: 2.14 MIU/L (ref 0.44–3.98)
VLDL: 19 MG/DL (ref 0–40)
WBC # BLD AUTO: 5 X10*3/UL (ref 4.4–11.3)

## 2024-02-09 PROCEDURE — 83036 HEMOGLOBIN GLYCOSYLATED A1C: CPT

## 2024-02-09 PROCEDURE — 80307 DRUG TEST PRSMV CHEM ANLYZR: CPT

## 2024-02-09 PROCEDURE — 36415 COLL VENOUS BLD VENIPUNCTURE: CPT

## 2024-02-09 PROCEDURE — 80061 LIPID PANEL: CPT

## 2024-02-09 PROCEDURE — 80053 COMPREHEN METABOLIC PANEL: CPT

## 2024-02-09 PROCEDURE — 99396 PREV VISIT EST AGE 40-64: CPT | Performed by: STUDENT IN AN ORGANIZED HEALTH CARE EDUCATION/TRAINING PROGRAM

## 2024-02-09 PROCEDURE — 93000 ELECTROCARDIOGRAM COMPLETE: CPT | Performed by: STUDENT IN AN ORGANIZED HEALTH CARE EDUCATION/TRAINING PROGRAM

## 2024-02-09 PROCEDURE — 99213 OFFICE O/P EST LOW 20 MIN: CPT | Performed by: STUDENT IN AN ORGANIZED HEALTH CARE EDUCATION/TRAINING PROGRAM

## 2024-02-09 PROCEDURE — 80324 DRUG SCREEN AMPHETAMINES 1/2: CPT

## 2024-02-09 PROCEDURE — 1036F TOBACCO NON-USER: CPT | Performed by: STUDENT IN AN ORGANIZED HEALTH CARE EDUCATION/TRAINING PROGRAM

## 2024-02-09 PROCEDURE — 85025 COMPLETE CBC W/AUTO DIFF WBC: CPT

## 2024-02-09 PROCEDURE — 84153 ASSAY OF PSA TOTAL: CPT

## 2024-02-09 PROCEDURE — 84443 ASSAY THYROID STIM HORMONE: CPT

## 2024-02-09 RX ORDER — DEXTROAMPHETAMINE SACCHARATE, AMPHETAMINE ASPARTATE, DEXTROAMPHETAMINE SULFATE AND AMPHETAMINE SULFATE 7.5; 7.5; 7.5; 7.5 MG/1; MG/1; MG/1; MG/1
30 TABLET ORAL DAILY
Qty: 30 TABLET | Refills: 0 | Status: SHIPPED | OUTPATIENT
Start: 2024-03-10 | End: 2024-05-09 | Stop reason: SDUPTHER

## 2024-02-09 RX ORDER — DEXTROAMPHETAMINE SACCHARATE, AMPHETAMINE ASPARTATE, DEXTROAMPHETAMINE SULFATE AND AMPHETAMINE SULFATE 3.75; 3.75; 3.75; 3.75 MG/1; MG/1; MG/1; MG/1
15 TABLET ORAL DAILY
Qty: 30 TABLET | Refills: 0 | Status: SHIPPED | OUTPATIENT
Start: 2024-02-09 | End: 2024-05-09 | Stop reason: SDUPTHER

## 2024-02-09 RX ORDER — DEXTROAMPHETAMINE SACCHARATE, AMPHETAMINE ASPARTATE, DEXTROAMPHETAMINE SULFATE AND AMPHETAMINE SULFATE 3.75; 3.75; 3.75; 3.75 MG/1; MG/1; MG/1; MG/1
15 TABLET ORAL DAILY
Qty: 30 TABLET | Refills: 0 | Status: SHIPPED | OUTPATIENT
Start: 2024-03-10 | End: 2024-05-09 | Stop reason: SDUPTHER

## 2024-02-09 RX ORDER — DEXTROAMPHETAMINE SACCHARATE, AMPHETAMINE ASPARTATE, DEXTROAMPHETAMINE SULFATE AND AMPHETAMINE SULFATE 7.5; 7.5; 7.5; 7.5 MG/1; MG/1; MG/1; MG/1
30 TABLET ORAL DAILY
Qty: 30 TABLET | Refills: 0 | Status: SHIPPED | OUTPATIENT
Start: 2024-02-09 | End: 2024-05-09 | Stop reason: SDUPTHER

## 2024-02-09 RX ORDER — DEXTROAMPHETAMINE SACCHARATE, AMPHETAMINE ASPARTATE, DEXTROAMPHETAMINE SULFATE AND AMPHETAMINE SULFATE 3.75; 3.75; 3.75; 3.75 MG/1; MG/1; MG/1; MG/1
15 TABLET ORAL DAILY
Qty: 30 TABLET | Refills: 0 | Status: SHIPPED | OUTPATIENT
Start: 2024-04-09 | End: 2024-05-09 | Stop reason: SDUPTHER

## 2024-02-09 RX ORDER — DEXTROAMPHETAMINE SACCHARATE, AMPHETAMINE ASPARTATE, DEXTROAMPHETAMINE SULFATE AND AMPHETAMINE SULFATE 7.5; 7.5; 7.5; 7.5 MG/1; MG/1; MG/1; MG/1
30 TABLET ORAL DAILY
Qty: 30 TABLET | Refills: 0 | Status: SHIPPED | OUTPATIENT
Start: 2024-04-09 | End: 2024-05-09 | Stop reason: SDUPTHER

## 2024-02-09 ASSESSMENT — PATIENT HEALTH QUESTIONNAIRE - PHQ9
2. FEELING DOWN, DEPRESSED OR HOPELESS: NOT AT ALL
SUM OF ALL RESPONSES TO PHQ9 QUESTIONS 1 AND 2: 0
1. LITTLE INTEREST OR PLEASURE IN DOING THINGS: NOT AT ALL

## 2024-02-09 NOTE — PROGRESS NOTES
Subjective   Patient ID: Raphael Cristina is a 58 y.o. male who presents for Annual Exam.  Today he is accompanied by alone.     HPI  Health maintenance  Overall patient is doing well.  Denies any chest pain, shortness of breath or wheezing on exertion.  Denies any nausea/vomiting or diarrhea/constipation.  Denies urinary symptoms.  Denies any numbness or tingling in the body, denies any lightheadedness or balance problem.  Denies any recent changes in hearing or vision  Immunization: Tdap declined influenza vaccine declined  Shingrix declined   COVID-19 vaccine (Pfizer Moderna) declined:  Colon Cancer Screening: No family history, colonoscopy 2021 with 10-year clearance  Diet: Regular well-balanced diet  Exercise: Exercise regularly at the gym every day  Tobacco: Denies use  EtOH: Rarely Socially      ADHD  Controlled, taking Adderall 30 mg once daily in AM and 15 mg once daily in afternoon.  Reports increased tolerance to current dosage.   Patient tried to switch to Vyvanse 40 mg due to increasing tolerance to Adderall, but ultimately changed mind because of the increased cost of Vyvanse   UDS completed last visit (5/4/2023).   CSA to be signed in clinic today.   Denies chest pain, shortness of breath, palpitations, or dizziness.   UDS/CSA will be reordered and reviewed at today's visit    Current Outpatient Medications on File Prior to Visit   Medication Sig Dispense Refill    amphetamine-dextroamphetamine (Adderall) 15 mg tablet Take 1 tablet (15 mg) by mouth once daily. Do not start before January 1, 2024. 30 tablet 0    amphetamine-dextroamphetamine (Adderall) 15 mg tablet Take 1 tablet (15 mg) by mouth once daily. 30 tablet 0    amphetamine-dextroamphetamine (Adderall) 15 mg tablet Take 1 tablet (15 mg) by mouth once daily. 30 tablet 0    amphetamine-dextroamphetamine (Adderall) 30 mg tablet Take 1 tablet (30 mg) by mouth once daily. as directed Do not start before January 1, 2024. 30 tablet 0     "amphetamine-dextroamphetamine (Adderall) 30 mg tablet Take 1 tablet (30 mg) by mouth once daily. as directed 30 tablet 0    amphetamine-dextroamphetamine (Adderall) 30 mg tablet Take 1 tablet (30 mg) by mouth once daily. as directed 30 tablet 0     No current facility-administered medications on file prior to visit.        No Known Allergies      There is no immunization history on file for this patient.      Review of Systems  All pertinent positive symptoms are included in the history of present illness.  All other systems have been reviewed and are negative and noncontributory to this patient's current ailments.     Objective   /80 (BP Location: Left arm, Patient Position: Sitting, BP Cuff Size: Large adult)   Pulse 88   Ht 1.803 m (5' 11\")   Wt 106 kg (233 lb)   BMI 32.50 kg/m²   BSA: 2.3 meters squared  No visits with results within 1 Month(s) from this visit.   Latest known visit with results is:   Lab on 05/04/2023   Component Date Value Ref Range Status    DRUG SCREEN COMMENT URINE 05/04/2023 SEE BELOW   Final    Drug screen results are presumptive and should not be used to assess   compliance with prescribed medication. Definitive confirmatory drug testing   has been added to this sample for any positive screen result and will be   reported separately.   .  Toxicology screening results are reported qualitatively. The concentration   must be greater than or equal to the cutoff to be reported as positive. The   concentration at which the screening test can detect an individual drug or   metabolite varies. The absence of expected drug(s) and/or drug metabolite(s)   may indicate non-compliance, inappropriate timing of specimen collection   relative to drug administration, poor drug absorption, diluted/adulterated   urine, or limitations of testing. For medical purposes only; not valid for   forensic use.   .  Interpretive questions should be directed to the laboratory medical   directors.      " Amphetamine Screen, Urine 05/04/2023 PRESUMPTIVE POSITIVE (A)  NEGATIVE Final     CUTOFF LEVEL:  500 NG/ML   Cross-reactivity has been reported with high concentrations   of the following drugs: buproprion, chloroquine, chlorpromazine,   ephedrine, mephentermine, fenfluramine, phentermine,   phenylpropanolamine, pseudoephedrine, and propranolol.    Barbiturate Screen, Urine 05/04/2023 PRESUMPTIVE NEGATIVE  NEGATIVE Final     CUTOFF LEVEL: 200 NG/ML    BENZODIAZEPINE (PRESENCE) IN URINE* 05/04/2023 PRESUMPTIVE NEGATIVE  NEGATIVE Final     CUTOFF LEVEL: 200 NG/ML    Cannabinoid Screen, Urine 05/04/2023 PRESUMPTIVE NEGATIVE  NEGATIVE Final     CUTOFF LEVEL: 50 NG/ML    Cocaine Screen, Urine 05/04/2023 PRESUMPTIVE NEGATIVE  NEGATIVE Final     CUTOFF LEVEL: 150 NG/ML    Fentanyl, Ur 05/04/2023 PRESUMPTIVE NEGATIVE  NEGATIVE Final     CUTOFF LEVEL:  5 NG/ML    Methadone Screen, Urine 05/04/2023 PRESUMPTIVE NEGATIVE  NEGATIVE Final     CUTOFF LEVEL: 150 NG/ML   The metabolite L-alpha-acetylmethadol (LAAM) is not   detected by this method in concentrations that would   be found in the urine of patients on LAAM therapy.    Opiate Screen, Urine 05/04/2023 PRESUMPTIVE NEGATIVE  NEGATIVE Final     CUTOFF LEVEL: 300 NG/ML   The opiate screen does not detect fentanyl, meperidine, or    tramadol. Oxycodone is not consistently detected (refer to   Oxycodone Screen, Urine result).    Oxycodone Screen, Ur 05/04/2023 PRESUMPTIVE NEGATIVE  NEGATIVE Final     CUTOFF LEVEL: 100 NG/ML    This test will accurately detect both oxycodone and oxymorphone.         PCP Screen, Urine 05/04/2023 PRESUMPTIVE NEGATIVE  NEGATIVE Final     CUTOFF LEVEL:  25 NG/ML   Cross-reactivity has been reported with dextromethorphan.    Methamphetamine Quant, Ur 05/04/2023 <200  ng/mL Final    MDA, Urine 05/04/2023 <200  ng/mL Final    MDEA, Urine 05/04/2023 <200  ng/mL Final    Phentermine,Urine 05/04/2023 <200  ng/mL Final    Performed By: [facility]  [address] [address]  : Tyrone Carvalho MD, PhD    Amphetamines,Urine 05/04/2023 2726  ng/mL Final    Comment: Consistent with use of a drug containing amphetamine. May also   reflect metabolism of methamphetamine, when methamphetamine is   present.  Amphetamine and methamphetamine exist in d- and   l-isomeric forms.  These forms are not distinguished by this test.    Isomeric separation is available separately for an additional   charge.  INTERPRETIVE INFORMATION: Amphetamines, Urine,                             Quantitative  Methodology: Quantitative Liquid Chromatography-Tandem Mass   Spectrometry  Positive cutoff: 200 ng/mL unless specified below:  Amphetamine     50 ng/mL  For medical purposes only; not valid for forensic use.   The absence of expected drug(s) and/or drug metabolite(s) may   indicate non-compliance, inappropriate timing of specimen   collection relative to drug administration, poor drug absorption,   diluted/adulterated urine, or limitations of testing. The   concentration value must be greater than or equal to the cutoff to   be reported as positive. Interpretive                            questions should be directed   to the laboratory.  This test was developed and its performance characteristics   determined by [facility]. It has not been cleared or   approved by the US Food and Drug Administration. This test was   performed in a CLIA certified laboratory and is intended for   clinical purposes.    MDMA, Urine 05/04/2023 <200  ng/mL Final       Physical Exam  CONSTITUTIONAL - well nourished, well developed, looks like stated age, in no acute distress, not ill-appearing, and not tired appearing  SKIN - normal skin color and pigmentation, normal skin turgor without rash, lesions, or nodules visualized  HEAD - no trauma, normocephalic  EYES - normal external exam  CHEST -no distressed breathing, good effort  CARDIAC-irregular  rhythm, extrasystoles, regular rate normal S1-S2, no murmurs  EXTREMITIES - no edema, no deformities  NEUROLOGICAL - normal balance, normal motor, no ataxia  PSYCHIATRIC - alert, pleasant and cordial, age-appropriate    Assessment/Plan   Health maintenance  Complete history and physical examination was performed  EKG reveals frequent ectopic ventricular beats but without acute changes, no ST segment deviation normal T waves, heart rate of 89  Requisition for CBC, CMP, TSH, lipid panel, A1c ,PSA and UA were provided today  We will notify of test results once available and make treatment recommendations accordingly    2.  ADHD  Controlled, refills of both your Adderall 30 mg QAM and 15 mg QPM sent to pharmacy.   Discussed cardiovascular risk with increasing stimulant dosage.   UDS completed (2/9/2024).   CSA reviewed and signed (2/9/2024).     Thank you for letting us be a part of your care team.  Please call the office if you have further questions or concerns regarding your care    Otherwise, please follow-up in 3 months for continued care and refills   Keyur Gong MD  PGY1 FM Resident

## 2024-02-10 LAB
EST. AVERAGE GLUCOSE BLD GHB EST-MCNC: 100 MG/DL
HBA1C MFR BLD: 5.1 %

## 2024-02-11 NOTE — RESULT ENCOUNTER NOTE
Sugar, kidneys, electrolytes are all within normal limits    Liver function slightly increased with AST at 41 and ALT at 58 which is just barely outside the normal limits so I would recommend we consider repeating this study in 6 months    Hemoglobin A1c well within normal limits at 5.1%    Thyroid and prostate within normal limits    Cholesterol looks great at 141, HDL 38, LDL 84, triglycerides 93    Complete blood cell count shows no anemia

## 2024-02-14 LAB
AMPHET UR-MCNC: 2669 NG/ML
MDA UR-MCNC: <200 NG/ML
MDEA UR-MCNC: <200 NG/ML
MDMA UR-MCNC: <200 NG/ML
METHAMPHET UR-MCNC: <200 NG/ML
PHENTERMINE UR CFM-MCNC: <200 NG/ML

## 2024-02-15 LAB
AMPHET UR-MCNC: 2564 NG/ML
MDA UR-MCNC: <200 NG/ML
MDEA UR-MCNC: <200 NG/ML
MDMA UR-MCNC: <200 NG/ML
METHAMPHET UR-MCNC: <200 NG/ML
PHENTERMINE UR CFM-MCNC: <200 NG/ML

## 2024-05-08 NOTE — PROGRESS NOTES
Subjective   Patient ID: Raphael Cristina is a 58 y.o. male who presents for ADHD.  Today he is accompanied by alone.     HPI    ADHD  Controlled, taking Adderall 30 mg once daily in AM and 15 mg once daily in afternoon.  Reports good control on current regimen.   UDS completed (2/9/2024).   CSA reviewed and signed (2/9/2024).   Denies chest pain, shortness of breath, palpitations, or dizziness.       Current Outpatient Medications on File Prior to Visit   Medication Sig Dispense Refill    dutasteride (Avodart) 0.5 mg capsule Take 1 capsule (0.5 mg) by mouth once daily.      [DISCONTINUED] amphetamine-dextroamphetamine (Adderall) 15 mg tablet Take 1 tablet (15 mg) by mouth once daily. Do not start before April 9, 2024. 30 tablet 0    [DISCONTINUED] amphetamine-dextroamphetamine (Adderall) 15 mg tablet Take 1 tablet (15 mg) by mouth once daily. Do not start before March 10, 2024. 30 tablet 0    [DISCONTINUED] amphetamine-dextroamphetamine (Adderall) 15 mg tablet Take 1 tablet (15 mg) by mouth once daily. 30 tablet 0    [DISCONTINUED] amphetamine-dextroamphetamine (Adderall) 30 mg tablet Take 1 tablet (30 mg) by mouth once daily. as directed Do not start before April 9, 2024. 30 tablet 0    [DISCONTINUED] amphetamine-dextroamphetamine (Adderall) 30 mg tablet Take 1 tablet (30 mg) by mouth once daily. as directed Do not start before March 10, 2024. 30 tablet 0    [DISCONTINUED] amphetamine-dextroamphetamine (Adderall) 30 mg tablet Take 1 tablet (30 mg) by mouth once daily. as directed 30 tablet 0     No current facility-administered medications on file prior to visit.        No Known Allergies      There is no immunization history on file for this patient.      Review of Systems  All pertinent positive symptoms are included in the history of present illness.  All other systems have been reviewed and are negative and noncontributory to this patient's current ailments.     Objective   /70 (BP Location: Left arm,  "Patient Position: Sitting, BP Cuff Size: Large adult)   Pulse 95   Ht 1.803 m (5' 11\")   Wt 107 kg (236 lb)   SpO2 98%   BMI 32.92 kg/m²   BSA: 2.32 meters squared  No visits with results within 1 Month(s) from this visit.   Latest known visit with results is:   Lab on 02/09/2024   Component Date Value Ref Range Status    Prostate Specific AG 02/09/2024 0.30  <=4.00 ng/mL Final    Thyroid Stimulating Hormone 02/09/2024 2.14  0.44 - 3.98 mIU/L Final    Cholesterol 02/09/2024 141  0 - 199 mg/dL Final          Age      Desirable   Borderline High   High     0-19 Y     0 - 169       170 - 199     >/= 200    20-24 Y     0 - 189       190 - 224     >/= 225         >24 Y     0 - 199       200 - 239     >/= 240   **All ranges are based on fasting samples. Specific   therapeutic targets will vary based on patient-specific   cardiac risk.    Pediatric guidelines reference:Pediatrics 2011, 128(S5).Adult guidelines reference: NCEP ATPIII Guidelines,LIEN 2001, 258:2486-97    Venipuncture immediately after or during the administration of Metamizole may lead to falsely low results. Testing should be performed immediately prior to Metamizole dosing.    HDL-Cholesterol 02/09/2024 38.8  mg/dL Final      Age       Very Low   Low     Normal    High    0-19 Y    < 35      < 40     40-45     ----  20-24 Y    ----     < 40      >45      ----        >24 Y      ----     < 40     40-60      >60      Cholesterol/HDL Ratio 02/09/2024 3.6   Final      Ref Values  Desirable  < 3.4  High Risk  > 5.0    LDL Calculated 02/09/2024 84  <=99 mg/dL Final                                Near   Borderline      AGE      Desirable  Optimal    High     High     Very High     0-19 Y     0 - 109     ---    110-129   >/= 130     ----    20-24 Y     0 - 119     ---    120-159   >/= 160     ----      >24 Y     0 -  99   100-129  130-159   160-189     >/=190      VLDL 02/09/2024 19  0 - 40 mg/dL Final    Triglycerides 02/09/2024 93  0 - 149 mg/dL Final       " Age         Desirable   Borderline High   High     Very High   0 D-90 D    19 - 174         ----         ----        ----  91 D- 9 Y     0 -  74        75 -  99     >/= 100      ----    10-19 Y     0 -  89        90 - 129     >/= 130      ----    20-24 Y     0 - 114       115 - 149     >/= 150      ----         >24 Y     0 - 149       150 - 199    200- 499    >/= 500    Venipuncture immediately after or during the administration of Metamizole may lead to falsely low results. Testing should be performed immediately prior to Metamizole dosing.    Non HDL Cholesterol 02/09/2024 102  0 - 149 mg/dL Final          Age       Desirable   Borderline High   High     Very High     0-19 Y     0 - 119       120 - 144     >/= 145    >/= 160    20-24 Y     0 - 149       150 - 189     >/= 190      ----         >24 Y    30 mg/dL above LDL Cholesterol goal      Glucose 02/09/2024 77  74 - 99 mg/dL Final    Sodium 02/09/2024 136  136 - 145 mmol/L Final    Potassium 02/09/2024 4.0  3.5 - 5.3 mmol/L Final    Chloride 02/09/2024 103  98 - 107 mmol/L Final    Bicarbonate 02/09/2024 26  21 - 32 mmol/L Final    Anion Gap 02/09/2024 11  10 - 20 mmol/L Final    Urea Nitrogen 02/09/2024 19  6 - 23 mg/dL Final    Creatinine 02/09/2024 0.83  0.50 - 1.30 mg/dL Final    eGFR 02/09/2024 >90  >60 mL/min/1.73m*2 Final    Calculations of estimated GFR are performed using the 2021 CKD-EPI Study Refit equation without the race variable for the IDMS-Traceable creatinine methods.  https://jasn.asnjournals.org/content/early/2021/09/22/ASN.9731366153    Calcium 02/09/2024 8.6  8.6 - 10.3 mg/dL Final    Albumin 02/09/2024 3.9  3.4 - 5.0 g/dL Final    Alkaline Phosphatase 02/09/2024 39  33 - 120 U/L Final    Total Protein 02/09/2024 6.5  6.4 - 8.2 g/dL Final    AST 02/09/2024 41 (H)  9 - 39 U/L Final    Bilirubin, Total 02/09/2024 0.4  0.0 - 1.2 mg/dL Final    ALT 02/09/2024 58 (H)  10 - 52 U/L Final    Patients treated with Sulfasalazine may generate falsely  decreased results for ALT.    WBC 02/09/2024 5.0  4.4 - 11.3 x10*3/uL Final    nRBC 02/09/2024 0.0  0.0 - 0.0 /100 WBCs Final    RBC 02/09/2024 5.67  4.50 - 5.90 x10*6/uL Final    Hemoglobin 02/09/2024 16.9  13.5 - 17.5 g/dL Final    Hematocrit 02/09/2024 51.1  41.0 - 52.0 % Final    MCV 02/09/2024 90  80 - 100 fL Final    MCH 02/09/2024 29.8  26.0 - 34.0 pg Final    MCHC 02/09/2024 33.1  32.0 - 36.0 g/dL Final    RDW 02/09/2024 13.4  11.5 - 14.5 % Final    Platelets 02/09/2024 241  150 - 450 x10*3/uL Final    Neutrophils % 02/09/2024 51.0  40.0 - 80.0 % Final    Immature Granulocytes %, Automated 02/09/2024 0.2  0.0 - 0.9 % Final    Immature Granulocyte Count (IG) includes promyelocytes, myelocytes and metamyelocytes but does not include bands. Percent differential counts (%) should be interpreted in the context of the absolute cell counts (cells/UL).    Lymphocytes % 02/09/2024 28.7  13.0 - 44.0 % Final    Monocytes % 02/09/2024 12.5  2.0 - 10.0 % Final    Eosinophils % 02/09/2024 6.2  0.0 - 6.0 % Final    Basophils % 02/09/2024 1.4  0.0 - 2.0 % Final    Neutrophils Absolute 02/09/2024 2.56  1.20 - 7.70 x10*3/uL Final    Percent differential counts (%) should be interpreted in the context of the absolute cell counts (cells/uL).    Immature Granulocytes Absolute, Au* 02/09/2024 0.01  0.00 - 0.70 x10*3/uL Final    Lymphocytes Absolute 02/09/2024 1.44  1.20 - 4.80 x10*3/uL Final    Monocytes Absolute 02/09/2024 0.63  0.10 - 1.00 x10*3/uL Final    Eosinophils Absolute 02/09/2024 0.31  0.00 - 0.70 x10*3/uL Final    Basophils Absolute 02/09/2024 0.07  0.00 - 0.10 x10*3/uL Final    Hemoglobin A1C 02/09/2024 5.1  see below % Final    Estimated Average Glucose 02/09/2024 100  Not Established mg/dL Final    Amphetamine Screen, Urine 02/09/2024 Presumptive Positive (A)  Presumptive Negative Final    CUTOFF LEVEL: 500 NG/ML   Cross-reactivity has been reported with high concentrations   of the following drugs: buproprion,  chloroquine, chlorpromazine,   ephedrine, mephentermine, fenfluramine, phentermine,   phenylpropanolamine, pseudoephedrine, and propranolol.    Barbiturate Screen, Urine 02/09/2024 Presumptive Negative  Presumptive Negative Final    CUTOFF LEVEL: 200 NG/ML    Benzodiazepines Screen, Urine 02/09/2024 Presumptive Negative  Presumptive Negative Final    CUTOFF LEVEL: 200 NG/ML    Cannabinoid Screen, Urine 02/09/2024 Presumptive Negative  Presumptive Negative Final    CUTOFF LEVEL: 50 NG/ML    Cocaine Metabolite Screen, Urine 02/09/2024 Presumptive Negative  Presumptive Negative Final    CUTOFF LEVEL: 150 NG/ML    Fentanyl Screen, Urine 02/09/2024 Presumptive Negative  Presumptive Negative Final    CUTOFF LEVEL: 5 NG/ML    Opiate Screen, Urine 02/09/2024 Presumptive Negative  Presumptive Negative Final    CUTOFF LEVEL: 300 NG/ML  The opiate screen does not detect fentanyl, meperidine, or   tramadol. Oxycodone is not consistently detected (refer to  Oxycodone Screen, Urine result).    Oxycodone Screen, Urine 02/09/2024 Presumptive Negative  Presumptive Negative Final    CUTOFF LEVEL: 100 NG/ML  This test will accurately detect both oxycodone and oxymorphone.    PCP Screen, Urine 02/09/2024 Presumptive Negative  Presumptive Negative Final    CUTOFF LEVEL:  25 NG/ML  Cross-reactivity has been reported with dextromethorphan.    Methamphetamine Quant, Ur 02/09/2024 <200  ng/mL Final    MDA, Urine 02/09/2024 <200  ng/mL Final    MDEA, Urine 02/09/2024 <200  ng/mL Final    Phentermine,Urine 02/09/2024 <200  ng/mL Final    Performed By: [facility]  [address] [place] [address]  : Tyrone Carvalho MD, PhD  CLIA Number: [id number]    Amphetamines,Urine 02/09/2024 2669  ng/mL Final    Comment:   Consistent with use of a drug containing amphetamine. May also   reflect metabolism of methamphetamine, when methamphetamine is   present.  Amphetamine and methamphetamine exist in d- and    l-isomeric forms.  These forms are not distinguished by this test.    Isomeric separation is available separately for an additional   charge.  INTERPRETIVE INFORMATION: Amphetamines, Urine,                             Quantitative    Methodology: Quantitative Liquid Chromatography-Tandem Mass   Spectrometry    Positive cutoff: 200 ng/mL unless specified below:  Amphetamine     50 ng/mL    For medical purposes only; not valid for forensic use.     The absence of expected drug(s) and/or drug metabolite(s) may   indicate non-compliance, inappropriate timing of specimen   collection relative to drug administration, poor drug absorption,   diluted/adulterated urine, or limitations of testing. The   concentration value must be greater than or equal to the cutoff to   be reported as positive.                            Interpretive questions should be directed   to the laboratory.    This test was developed and its performance characteristics   determined by [facility]. It has not been cleared or   approved by the US Food and Drug Administration. This test was   performed in a CLIA certified laboratory and is intended for   clinical purposes.    MDMA, Urine 02/09/2024 <200  ng/mL Final    Methamphetamine Quant, Ur 02/09/2024 <200  ng/mL Final    MDA, Urine 02/09/2024 <200  ng/mL Final    MDEA, Urine 02/09/2024 <200  ng/mL Final    Phentermine,Urine 02/09/2024 <200  ng/mL Final    Performed By: [facility]  [address] [place] [address]  : Tyrone Carvalho MD, PhD  CLIA Number: [id number]    Amphetamines,Urine 02/09/2024 2564  ng/mL Final    Comment:   Consistent with use of a drug containing amphetamine. May also   reflect metabolism of methamphetamine, when methamphetamine is   present.  Amphetamine and methamphetamine exist in d- and   l-isomeric forms.  These forms are not distinguished by this test.    Isomeric separation is available separately for an additional    charge.  INTERPRETIVE INFORMATION: Amphetamines, Urine,                             Quantitative    Methodology: Quantitative Liquid Chromatography-Tandem Mass   Spectrometry    Positive cutoff: 200 ng/mL unless specified below:  Amphetamine     50 ng/mL    For medical purposes only; not valid for forensic use.     The absence of expected drug(s) and/or drug metabolite(s) may   indicate non-compliance, inappropriate timing of specimen   collection relative to drug administration, poor drug absorption,   diluted/adulterated urine, or limitations of testing. The   concentration value must be greater than or equal to the cutoff to   be reported as positive.                            Interpretive questions should be directed   to the laboratory.    This test was developed and its performance characteristics   determined by [facility]. It has not been cleared or   approved by the US Food and Drug Administration. This test was   performed in a CLIA certified laboratory and is intended for   clinical purposes.    MDMA, Urine 02/09/2024 <200  ng/mL Final       Physical Exam  CONSTITUTIONAL - well nourished, well developed, looks like stated age, in no acute distress, not ill-appearing, and not tired appearing  SKIN - normal skin color and pigmentation, normal skin turgor without rash, lesions, or nodules visualized  HEAD - no trauma, normocephalic  EYES - normal external exam  CHEST -no distressed breathing, good effort  CARDIAC-irregular rhythm, extrasystoles, regular rate normal S1-S2, no murmurs  EXTREMITIES - no edema, no deformities  NEUROLOGICAL - normal balance, normal motor, no ataxia  PSYCHIATRIC - alert, pleasant and cordial, age-appropriate    Assessment/Plan     1.  ADHD  Controlled, refills of both your Adderall 30 mg QAM and 15 mg QPM sent to pharmacy.   UDS and CSA completed (2/9/2024).     Thank you for letting us be a part of your care team.  Please call the office if you have further questions or  concerns regarding your care    Otherwise, please follow-up in 3 months for continued care and refills

## 2024-05-09 ENCOUNTER — OFFICE VISIT (OUTPATIENT)
Dept: PRIMARY CARE | Facility: CLINIC | Age: 59
End: 2024-05-09
Payer: COMMERCIAL

## 2024-05-09 VITALS
HEART RATE: 95 BPM | BODY MASS INDEX: 33.04 KG/M2 | SYSTOLIC BLOOD PRESSURE: 122 MMHG | DIASTOLIC BLOOD PRESSURE: 70 MMHG | WEIGHT: 236 LBS | HEIGHT: 71 IN | OXYGEN SATURATION: 98 %

## 2024-05-09 DIAGNOSIS — F98.8 ATTENTION DEFICIT DISORDER (ADD) IN ADULT: ICD-10-CM

## 2024-05-09 DIAGNOSIS — F98.8 ATTENTION DEFICIT DISORDER, UNSPECIFIED HYPERACTIVITY PRESENCE: Primary | ICD-10-CM

## 2024-05-09 PROCEDURE — 1036F TOBACCO NON-USER: CPT | Performed by: STUDENT IN AN ORGANIZED HEALTH CARE EDUCATION/TRAINING PROGRAM

## 2024-05-09 PROCEDURE — 99213 OFFICE O/P EST LOW 20 MIN: CPT | Performed by: STUDENT IN AN ORGANIZED HEALTH CARE EDUCATION/TRAINING PROGRAM

## 2024-05-09 RX ORDER — DEXTROAMPHETAMINE SACCHARATE, AMPHETAMINE ASPARTATE, DEXTROAMPHETAMINE SULFATE AND AMPHETAMINE SULFATE 3.75; 3.75; 3.75; 3.75 MG/1; MG/1; MG/1; MG/1
15 TABLET ORAL DAILY
Qty: 30 TABLET | Refills: 0 | Status: SHIPPED | OUTPATIENT
Start: 2024-05-09 | End: 2024-06-08

## 2024-05-09 RX ORDER — DEXTROAMPHETAMINE SACCHARATE, AMPHETAMINE ASPARTATE, DEXTROAMPHETAMINE SULFATE AND AMPHETAMINE SULFATE 7.5; 7.5; 7.5; 7.5 MG/1; MG/1; MG/1; MG/1
30 TABLET ORAL DAILY
Qty: 30 TABLET | Refills: 0 | Status: SHIPPED | OUTPATIENT
Start: 2024-07-08 | End: 2024-08-07

## 2024-05-09 RX ORDER — DEXTROAMPHETAMINE SACCHARATE, AMPHETAMINE ASPARTATE, DEXTROAMPHETAMINE SULFATE AND AMPHETAMINE SULFATE 3.75; 3.75; 3.75; 3.75 MG/1; MG/1; MG/1; MG/1
15 TABLET ORAL DAILY
Qty: 30 TABLET | Refills: 0 | Status: SHIPPED | OUTPATIENT
Start: 2024-06-08 | End: 2024-07-08

## 2024-05-09 RX ORDER — DEXTROAMPHETAMINE SACCHARATE, AMPHETAMINE ASPARTATE, DEXTROAMPHETAMINE SULFATE AND AMPHETAMINE SULFATE 3.75; 3.75; 3.75; 3.75 MG/1; MG/1; MG/1; MG/1
15 TABLET ORAL DAILY
Qty: 30 TABLET | Refills: 0 | Status: SHIPPED | OUTPATIENT
Start: 2024-07-08 | End: 2024-08-07

## 2024-05-09 RX ORDER — DEXTROAMPHETAMINE SACCHARATE, AMPHETAMINE ASPARTATE, DEXTROAMPHETAMINE SULFATE AND AMPHETAMINE SULFATE 7.5; 7.5; 7.5; 7.5 MG/1; MG/1; MG/1; MG/1
30 TABLET ORAL DAILY
Qty: 30 TABLET | Refills: 0 | Status: SHIPPED | OUTPATIENT
Start: 2024-05-09 | End: 2024-06-08

## 2024-05-09 RX ORDER — DEXTROAMPHETAMINE SACCHARATE, AMPHETAMINE ASPARTATE, DEXTROAMPHETAMINE SULFATE AND AMPHETAMINE SULFATE 7.5; 7.5; 7.5; 7.5 MG/1; MG/1; MG/1; MG/1
30 TABLET ORAL DAILY
Qty: 30 TABLET | Refills: 0 | Status: SHIPPED | OUTPATIENT
Start: 2024-06-08 | End: 2024-07-08

## 2024-05-09 RX ORDER — DUTASTERIDE 0.5 MG/1
0.5 CAPSULE, LIQUID FILLED ORAL DAILY
COMMUNITY
Start: 2024-04-05

## 2024-08-02 ENCOUNTER — APPOINTMENT (OUTPATIENT)
Dept: PRIMARY CARE | Facility: CLINIC | Age: 59
End: 2024-08-02
Payer: COMMERCIAL

## 2024-08-02 VITALS
DIASTOLIC BLOOD PRESSURE: 68 MMHG | OXYGEN SATURATION: 97 % | WEIGHT: 238 LBS | HEART RATE: 94 BPM | BODY MASS INDEX: 33.19 KG/M2 | SYSTOLIC BLOOD PRESSURE: 120 MMHG

## 2024-08-02 DIAGNOSIS — F98.8 ATTENTION DEFICIT DISORDER, UNSPECIFIED HYPERACTIVITY PRESENCE: ICD-10-CM

## 2024-08-02 DIAGNOSIS — M72.2 PLANTAR FASCIITIS OF RIGHT FOOT: Primary | ICD-10-CM

## 2024-08-02 DIAGNOSIS — F98.8 ATTENTION DEFICIT DISORDER (ADD) IN ADULT: ICD-10-CM

## 2024-08-02 PROCEDURE — 99214 OFFICE O/P EST MOD 30 MIN: CPT | Performed by: STUDENT IN AN ORGANIZED HEALTH CARE EDUCATION/TRAINING PROGRAM

## 2024-08-02 PROCEDURE — 1036F TOBACCO NON-USER: CPT | Performed by: STUDENT IN AN ORGANIZED HEALTH CARE EDUCATION/TRAINING PROGRAM

## 2024-08-02 RX ORDER — DEXTROAMPHETAMINE SACCHARATE, AMPHETAMINE ASPARTATE, DEXTROAMPHETAMINE SULFATE AND AMPHETAMINE SULFATE 3.75; 3.75; 3.75; 3.75 MG/1; MG/1; MG/1; MG/1
15 TABLET ORAL DAILY
Qty: 30 TABLET | Refills: 0 | Status: SHIPPED | OUTPATIENT
Start: 2024-08-02 | End: 2024-09-01

## 2024-08-02 RX ORDER — DEXTROAMPHETAMINE SACCHARATE, AMPHETAMINE ASPARTATE, DEXTROAMPHETAMINE SULFATE AND AMPHETAMINE SULFATE 7.5; 7.5; 7.5; 7.5 MG/1; MG/1; MG/1; MG/1
30 TABLET ORAL DAILY
Qty: 30 TABLET | Refills: 0 | Status: SHIPPED | OUTPATIENT
Start: 2024-09-01 | End: 2024-10-01

## 2024-08-02 RX ORDER — DEXTROAMPHETAMINE SACCHARATE, AMPHETAMINE ASPARTATE, DEXTROAMPHETAMINE SULFATE AND AMPHETAMINE SULFATE 3.75; 3.75; 3.75; 3.75 MG/1; MG/1; MG/1; MG/1
15 TABLET ORAL DAILY
Qty: 30 TABLET | Refills: 0 | Status: SHIPPED | OUTPATIENT
Start: 2024-09-01 | End: 2024-10-01

## 2024-08-02 RX ORDER — METHYLPREDNISOLONE 4 MG/1
TABLET ORAL
Qty: 21 TABLET | Refills: 0 | Status: SHIPPED | OUTPATIENT
Start: 2024-08-02

## 2024-08-02 RX ORDER — DEXTROAMPHETAMINE SACCHARATE, AMPHETAMINE ASPARTATE, DEXTROAMPHETAMINE SULFATE AND AMPHETAMINE SULFATE 7.5; 7.5; 7.5; 7.5 MG/1; MG/1; MG/1; MG/1
30 TABLET ORAL DAILY
Qty: 30 TABLET | Refills: 0 | Status: SHIPPED | OUTPATIENT
Start: 2024-08-02 | End: 2024-09-01

## 2024-08-02 RX ORDER — DEXTROAMPHETAMINE SACCHARATE, AMPHETAMINE ASPARTATE, DEXTROAMPHETAMINE SULFATE AND AMPHETAMINE SULFATE 3.75; 3.75; 3.75; 3.75 MG/1; MG/1; MG/1; MG/1
15 TABLET ORAL DAILY
Qty: 30 TABLET | Refills: 0 | Status: SHIPPED | OUTPATIENT
Start: 2024-10-01 | End: 2024-10-31

## 2024-08-02 RX ORDER — DEXTROAMPHETAMINE SACCHARATE, AMPHETAMINE ASPARTATE, DEXTROAMPHETAMINE SULFATE AND AMPHETAMINE SULFATE 7.5; 7.5; 7.5; 7.5 MG/1; MG/1; MG/1; MG/1
30 TABLET ORAL DAILY
Qty: 30 TABLET | Refills: 0 | Status: SHIPPED | OUTPATIENT
Start: 2024-10-01 | End: 2024-10-31

## 2024-08-02 NOTE — PROGRESS NOTES
Subjective   Patient ID: Raphael Cristina is a 58 y.o. male who presents for ADHD.  Today he is accompanied by alone.     HPI  ADHD  Controlled, taking Adderall 30 mg once daily in AM and 15 mg once daily in afternoon.  Reports good control on current regimen.   UDS completed (2/9/2024).   CSA reviewed and signed (2/9/2024).   Denies chest pain, shortness of breath, palpitations, or dizziness.     2. Plantar Fasciitis   Patient has been having pain at the bottom of his foot for 1-2 months   He has been having pain almost all day relieved by massaging   It gets worse when he is on it and active   Wants advice/recommendations for treatment of signs/symptoms       Current Outpatient Medications on File Prior to Visit   Medication Sig Dispense Refill    amphetamine-dextroamphetamine (Adderall) 15 mg tablet Take 1 tablet (15 mg) by mouth once daily. Do not fill before July 8, 2024. 30 tablet 0    amphetamine-dextroamphetamine (Adderall) 15 mg tablet Take 1 tablet (15 mg) by mouth once daily. Do not fill before June 8, 2024. 30 tablet 0    amphetamine-dextroamphetamine (Adderall) 15 mg tablet Take 1 tablet (15 mg) by mouth once daily. 30 tablet 0    amphetamine-dextroamphetamine (Adderall) 30 mg tablet Take 1 tablet (30 mg) by mouth once daily. as directed Do not fill before July 8, 2024. 30 tablet 0    amphetamine-dextroamphetamine (Adderall) 30 mg tablet Take 1 tablet (30 mg) by mouth once daily. as directed Do not fill before June 8, 2024. 30 tablet 0    amphetamine-dextroamphetamine (Adderall) 30 mg tablet Take 1 tablet (30 mg) by mouth once daily. as directed 30 tablet 0    dutasteride (Avodart) 0.5 mg capsule Take 1 capsule (0.5 mg) by mouth once daily.       No current facility-administered medications on file prior to visit.        No Known Allergies      There is no immunization history on file for this patient.      Review of Systems  All pertinent positive symptoms are included in the history of present  illness.  All other systems have been reviewed and are negative and noncontributory to this patient's current ailments.     Objective   /68 (BP Location: Left arm, Patient Position: Sitting, BP Cuff Size: Large adult)   Pulse 94   Wt 108 kg (238 lb)   SpO2 97%   BMI 33.19 kg/m²   BSA: 2.33 meters squared  No visits with results within 1 Month(s) from this visit.   Latest known visit with results is:   Lab on 02/09/2024   Component Date Value Ref Range Status    Prostate Specific AG 02/09/2024 0.30  <=4.00 ng/mL Final    Thyroid Stimulating Hormone 02/09/2024 2.14  0.44 - 3.98 mIU/L Final    Cholesterol 02/09/2024 141  0 - 199 mg/dL Final          Age      Desirable   Borderline High   High     0-19 Y     0 - 169       170 - 199     >/= 200    20-24 Y     0 - 189       190 - 224     >/= 225         >24 Y     0 - 199       200 - 239     >/= 240   **All ranges are based on fasting samples. Specific   therapeutic targets will vary based on patient-specific   cardiac risk.    Pediatric guidelines reference:Pediatrics 2011, 128(S5).Adult guidelines reference: NCEP ATPIII Guidelines,LIEN 2001, 258:2486-97    Venipuncture immediately after or during the administration of Metamizole may lead to falsely low results. Testing should be performed immediately prior to Metamizole dosing.    HDL-Cholesterol 02/09/2024 38.8  mg/dL Final      Age       Very Low   Low     Normal    High    0-19 Y    < 35      < 40     40-45     ----  20-24 Y    ----     < 40      >45      ----        >24 Y      ----     < 40     40-60      >60      Cholesterol/HDL Ratio 02/09/2024 3.6   Final      Ref Values  Desirable  < 3.4  High Risk  > 5.0    LDL Calculated 02/09/2024 84  <=99 mg/dL Final                                Near   Borderline      AGE      Desirable  Optimal    High     High     Very High     0-19 Y     0 - 109     ---    110-129   >/= 130     ----    20-24 Y     0 - 119     ---    120-159   >/= 160     ----      >24 Y     0 -   99   100-129  130-159   160-189     >/=190      VLDL 02/09/2024 19  0 - 40 mg/dL Final    Triglycerides 02/09/2024 93  0 - 149 mg/dL Final       Age         Desirable   Borderline High   High     Very High   0 D-90 D    19 - 174         ----         ----        ----  91 D- 9 Y     0 -  74        75 -  99     >/= 100      ----    10-19 Y     0 -  89        90 - 129     >/= 130      ----    20-24 Y     0 - 114       115 - 149     >/= 150      ----         >24 Y     0 - 149       150 - 199    200- 499    >/= 500    Venipuncture immediately after or during the administration of Metamizole may lead to falsely low results. Testing should be performed immediately prior to Metamizole dosing.    Non HDL Cholesterol 02/09/2024 102  0 - 149 mg/dL Final          Age       Desirable   Borderline High   High     Very High     0-19 Y     0 - 119       120 - 144     >/= 145    >/= 160    20-24 Y     0 - 149       150 - 189     >/= 190      ----         >24 Y    30 mg/dL above LDL Cholesterol goal      Glucose 02/09/2024 77  74 - 99 mg/dL Final    Sodium 02/09/2024 136  136 - 145 mmol/L Final    Potassium 02/09/2024 4.0  3.5 - 5.3 mmol/L Final    Chloride 02/09/2024 103  98 - 107 mmol/L Final    Bicarbonate 02/09/2024 26  21 - 32 mmol/L Final    Anion Gap 02/09/2024 11  10 - 20 mmol/L Final    Urea Nitrogen 02/09/2024 19  6 - 23 mg/dL Final    Creatinine 02/09/2024 0.83  0.50 - 1.30 mg/dL Final    eGFR 02/09/2024 >90  >60 mL/min/1.73m*2 Final    Calculations of estimated GFR are performed using the 2021 CKD-EPI Study Refit equation without the race variable for the IDMS-Traceable creatinine methods.  https://jasn.asnjournals.org/content/early/2021/09/22/ASN.7758193090    Calcium 02/09/2024 8.6  8.6 - 10.3 mg/dL Final    Albumin 02/09/2024 3.9  3.4 - 5.0 g/dL Final    Alkaline Phosphatase 02/09/2024 39  33 - 120 U/L Final    Total Protein 02/09/2024 6.5  6.4 - 8.2 g/dL Final    AST 02/09/2024 41 (H)  9 - 39 U/L Final    Bilirubin,  Total 02/09/2024 0.4  0.0 - 1.2 mg/dL Final    ALT 02/09/2024 58 (H)  10 - 52 U/L Final    Patients treated with Sulfasalazine may generate falsely decreased results for ALT.    WBC 02/09/2024 5.0  4.4 - 11.3 x10*3/uL Final    nRBC 02/09/2024 0.0  0.0 - 0.0 /100 WBCs Final    RBC 02/09/2024 5.67  4.50 - 5.90 x10*6/uL Final    Hemoglobin 02/09/2024 16.9  13.5 - 17.5 g/dL Final    Hematocrit 02/09/2024 51.1  41.0 - 52.0 % Final    MCV 02/09/2024 90  80 - 100 fL Final    MCH 02/09/2024 29.8  26.0 - 34.0 pg Final    MCHC 02/09/2024 33.1  32.0 - 36.0 g/dL Final    RDW 02/09/2024 13.4  11.5 - 14.5 % Final    Platelets 02/09/2024 241  150 - 450 x10*3/uL Final    Neutrophils % 02/09/2024 51.0  40.0 - 80.0 % Final    Immature Granulocytes %, Automated 02/09/2024 0.2  0.0 - 0.9 % Final    Immature Granulocyte Count (IG) includes promyelocytes, myelocytes and metamyelocytes but does not include bands. Percent differential counts (%) should be interpreted in the context of the absolute cell counts (cells/UL).    Lymphocytes % 02/09/2024 28.7  13.0 - 44.0 % Final    Monocytes % 02/09/2024 12.5  2.0 - 10.0 % Final    Eosinophils % 02/09/2024 6.2  0.0 - 6.0 % Final    Basophils % 02/09/2024 1.4  0.0 - 2.0 % Final    Neutrophils Absolute 02/09/2024 2.56  1.20 - 7.70 x10*3/uL Final    Percent differential counts (%) should be interpreted in the context of the absolute cell counts (cells/uL).    Immature Granulocytes Absolute, Au* 02/09/2024 0.01  0.00 - 0.70 x10*3/uL Final    Lymphocytes Absolute 02/09/2024 1.44  1.20 - 4.80 x10*3/uL Final    Monocytes Absolute 02/09/2024 0.63  0.10 - 1.00 x10*3/uL Final    Eosinophils Absolute 02/09/2024 0.31  0.00 - 0.70 x10*3/uL Final    Basophils Absolute 02/09/2024 0.07  0.00 - 0.10 x10*3/uL Final    Hemoglobin A1C 02/09/2024 5.1  see below % Final    Estimated Average Glucose 02/09/2024 100  Not Established mg/dL Final    Amphetamine Screen, Urine 02/09/2024 Presumptive Positive (A)   Presumptive Negative Final    CUTOFF LEVEL: 500 NG/ML   Cross-reactivity has been reported with high concentrations   of the following drugs: buproprion, chloroquine, chlorpromazine,   ephedrine, mephentermine, fenfluramine, phentermine,   phenylpropanolamine, pseudoephedrine, and propranolol.    Barbiturate Screen, Urine 02/09/2024 Presumptive Negative  Presumptive Negative Final    CUTOFF LEVEL: 200 NG/ML    Benzodiazepines Screen, Urine 02/09/2024 Presumptive Negative  Presumptive Negative Final    CUTOFF LEVEL: 200 NG/ML    Cannabinoid Screen, Urine 02/09/2024 Presumptive Negative  Presumptive Negative Final    CUTOFF LEVEL: 50 NG/ML    Cocaine Metabolite Screen, Urine 02/09/2024 Presumptive Negative  Presumptive Negative Final    CUTOFF LEVEL: 150 NG/ML    Fentanyl Screen, Urine 02/09/2024 Presumptive Negative  Presumptive Negative Final    CUTOFF LEVEL: 5 NG/ML    Opiate Screen, Urine 02/09/2024 Presumptive Negative  Presumptive Negative Final    CUTOFF LEVEL: 300 NG/ML  The opiate screen does not detect fentanyl, meperidine, or   tramadol. Oxycodone is not consistently detected (refer to  Oxycodone Screen, Urine result).    Oxycodone Screen, Urine 02/09/2024 Presumptive Negative  Presumptive Negative Final    CUTOFF LEVEL: 100 NG/ML  This test will accurately detect both oxycodone and oxymorphone.    PCP Screen, Urine 02/09/2024 Presumptive Negative  Presumptive Negative Final    CUTOFF LEVEL:  25 NG/ML  Cross-reactivity has been reported with dextromethorphan.    Methamphetamine Quant, Ur 02/09/2024 <200  ng/mL Final    MDA, Urine 02/09/2024 <200  ng/mL Final    MDEA, Urine 02/09/2024 <200  ng/mL Final    Phentermine,Urine 02/09/2024 <200  ng/mL Final    Performed By: [facility]  [address] [place] [id number]  : Tyrone Carvalho MD, PhD  CLIA Number: [id number]    Amphetamines,Urine 02/09/2024 2669  ng/mL Final    Comment:   Consistent with use of a drug containing  amphetamine. May also   reflect metabolism of methamphetamine, when methamphetamine is   present.  Amphetamine and methamphetamine exist in d- and   l-isomeric forms.  These forms are not distinguished by this test.    Isomeric separation is available separately for an additional   charge.  INTERPRETIVE INFORMATION: Amphetamines, Urine,                             Quantitative    Methodology: Quantitative Liquid Chromatography-Tandem Mass   Spectrometry    Positive cutoff: 200 ng/mL unless specified below:  Amphetamine     50 ng/mL    For medical purposes only; not valid for forensic use.     The absence of expected drug(s) and/or drug metabolite(s) may   indicate non-compliance, inappropriate timing of specimen   collection relative to drug administration, poor drug absorption,   diluted/adulterated urine, or limitations of testing. The   concentration value must be greater than or equal to the cutoff to   be reported as positive.                            Interpretive questions should be directed   to the laboratory.    This test was developed and its performance characteristics   determined by [facility] It has not been cleared or   approved by the US Food and Drug Administration. This test was   performed in a CLIA certified laboratory and is intended for   clinical purposes.    MDMA, Urine 02/09/2024 <200  ng/mL Final    Methamphetamine Quant, Ur 02/09/2024 <200  ng/mL Final    MDA, Urine 02/09/2024 <200  ng/mL Final    MDEA, Urine 02/09/2024 <200  ng/mL Final    Phentermine,Urine 02/09/2024 <200  ng/mL Final    Performed By: [facility]  [address] [place], [place] [id number]  : Tyrone Carvalho MD, PhD  CLIA Number: [id number]    Amphetamines,Urine 02/09/2024 2564  ng/mL Final    Comment:   Consistent with use of a drug containing amphetamine. May also   reflect metabolism of methamphetamine, when methamphetamine is   present.  Amphetamine and methamphetamine exist in  d- and   l-isomeric forms.  These forms are not distinguished by this test.    Isomeric separation is available separately for an additional   charge.  INTERPRETIVE INFORMATION: Amphetamines, Urine,                             Quantitative    Methodology: Quantitative Liquid Chromatography-Tandem Mass   Spectrometry    Positive cutoff: 200 ng/mL unless specified below:  Amphetamine     50 ng/mL    For medical purposes only; not valid for forensic use.     The absence of expected drug(s) and/or drug metabolite(s) may   indicate non-compliance, inappropriate timing of specimen   collection relative to drug administration, poor drug absorption,   diluted/adulterated urine, or limitations of testing. The   concentration value must be greater than or equal to the cutoff to   be reported as positive.                            Interpretive questions should be directed   to the laboratory.    This test was developed and its performance characteristics   determined by [facility]. It has not been cleared or   approved by the [place] Food and Drug Administration. This test was   performed in a CLIA certified laboratory and is intended for   clinical purposes.    MDMA, Urine 02/09/2024 <200  ng/mL Final       Physical Exam  CONSTITUTIONAL - well nourished, well developed, looks like stated age, in no acute distress, not ill-appearing, and not tired appearing  SKIN - normal skin color and pigmentation, normal skin turgor without rash, lesions, or nodules visualized  HEAD - no trauma, normocephalic  EYES - normal external exam  CHEST -no distressed breathing, good effort  EXTREMITIES - no edema, no deformities  MSK - tenderness on palpation of plantar aspect of foot   NEUROLOGICAL - normal balance, normal motor, no ataxia  PSYCHIATRIC - alert, pleasant and cordial, age-appropriate      Assessment/Plan   1.  ADHD  Controlled, refills of both your Adderall 30 mg QAM and 15 mg QPM sent to pharmacy.   UDS and CSA completed  (2/9/2024).       2. Plantar Fasciitis   We had a long discussion about your plantar fasciitis symptoms.  We sent in a prescription for a Medrol Dosepak for you to start taking.   If signs/symptoms do not resolve, we will make a referral for podiatry.     Thank you for letting us be a part of your care team.  Please call the office if you have further questions or concerns regarding your care     Otherwise, please follow-up in 3 months for continued care and refills

## 2024-10-03 ENCOUNTER — LAB (OUTPATIENT)
Dept: LAB | Facility: LAB | Age: 59
End: 2024-10-03
Payer: COMMERCIAL

## 2024-11-07 ENCOUNTER — APPOINTMENT (OUTPATIENT)
Dept: PRIMARY CARE | Facility: CLINIC | Age: 59
End: 2024-11-07
Payer: COMMERCIAL

## 2024-11-07 VITALS
OXYGEN SATURATION: 97 % | SYSTOLIC BLOOD PRESSURE: 130 MMHG | DIASTOLIC BLOOD PRESSURE: 80 MMHG | WEIGHT: 237.6 LBS | BODY MASS INDEX: 33.14 KG/M2 | HEART RATE: 107 BPM

## 2024-11-07 DIAGNOSIS — F98.8 ATTENTION DEFICIT DISORDER (ADD) IN ADULT: ICD-10-CM

## 2024-11-07 PROCEDURE — 1036F TOBACCO NON-USER: CPT | Performed by: STUDENT IN AN ORGANIZED HEALTH CARE EDUCATION/TRAINING PROGRAM

## 2024-11-07 PROCEDURE — 99213 OFFICE O/P EST LOW 20 MIN: CPT | Performed by: STUDENT IN AN ORGANIZED HEALTH CARE EDUCATION/TRAINING PROGRAM

## 2024-11-07 RX ORDER — DEXTROAMPHETAMINE SACCHARATE, AMPHETAMINE ASPARTATE, DEXTROAMPHETAMINE SULFATE AND AMPHETAMINE SULFATE 7.5; 7.5; 7.5; 7.5 MG/1; MG/1; MG/1; MG/1
30 TABLET ORAL DAILY
Qty: 30 TABLET | Refills: 0 | Status: SHIPPED | OUTPATIENT
Start: 2024-11-07 | End: 2024-12-07

## 2024-11-07 RX ORDER — DEXTROAMPHETAMINE SACCHARATE, AMPHETAMINE ASPARTATE, DEXTROAMPHETAMINE SULFATE AND AMPHETAMINE SULFATE 7.5; 7.5; 7.5; 7.5 MG/1; MG/1; MG/1; MG/1
30 TABLET ORAL DAILY
Qty: 30 TABLET | Refills: 0 | Status: SHIPPED | OUTPATIENT
Start: 2024-12-07 | End: 2025-01-06

## 2024-11-07 RX ORDER — DEXTROAMPHETAMINE SACCHARATE, AMPHETAMINE ASPARTATE, DEXTROAMPHETAMINE SULFATE AND AMPHETAMINE SULFATE 3.75; 3.75; 3.75; 3.75 MG/1; MG/1; MG/1; MG/1
15 TABLET ORAL DAILY
Qty: 30 TABLET | Refills: 0 | Status: SHIPPED | OUTPATIENT
Start: 2024-11-07 | End: 2024-12-07

## 2024-11-07 RX ORDER — DEXTROAMPHETAMINE SACCHARATE, AMPHETAMINE ASPARTATE, DEXTROAMPHETAMINE SULFATE AND AMPHETAMINE SULFATE 7.5; 7.5; 7.5; 7.5 MG/1; MG/1; MG/1; MG/1
30 TABLET ORAL DAILY
Qty: 30 TABLET | Refills: 0 | Status: SHIPPED | OUTPATIENT
Start: 2025-01-06 | End: 2025-02-05

## 2024-11-07 RX ORDER — DEXTROAMPHETAMINE SACCHARATE, AMPHETAMINE ASPARTATE, DEXTROAMPHETAMINE SULFATE AND AMPHETAMINE SULFATE 3.75; 3.75; 3.75; 3.75 MG/1; MG/1; MG/1; MG/1
15 TABLET ORAL DAILY
Qty: 30 TABLET | Refills: 0 | Status: SHIPPED | OUTPATIENT
Start: 2024-12-07 | End: 2025-01-06

## 2024-11-07 RX ORDER — DEXTROAMPHETAMINE SACCHARATE, AMPHETAMINE ASPARTATE, DEXTROAMPHETAMINE SULFATE AND AMPHETAMINE SULFATE 3.75; 3.75; 3.75; 3.75 MG/1; MG/1; MG/1; MG/1
15 TABLET ORAL DAILY
Qty: 30 TABLET | Refills: 0 | Status: SHIPPED | OUTPATIENT
Start: 2025-01-06 | End: 2025-02-05

## 2024-11-07 NOTE — PROGRESS NOTES
Subjective   Patient ID: Raphael Cristina is a 58 y.o. male who presents for ADHD.  Today he is accompanied by alone.     HPI  ADHD  Controlled, taking Adderall 30 mg once daily in AM and 15 mg once daily in afternoon.  Reports good control on current regimen.   UDS completed (2/9/2024).   CSA reviewed and signed (2/9/2024).   Denies chest pain, shortness of breath, palpitations, or dizziness.           Current Outpatient Medications on File Prior to Visit   Medication Sig Dispense Refill    amphetamine-dextroamphetamine (Adderall) 15 mg tablet Take 1 tablet (15 mg) by mouth once daily. Do not fill before September 1, 2024. 30 tablet 0    amphetamine-dextroamphetamine (Adderall) 15 mg tablet Take 1 tablet (15 mg) by mouth once daily. Do not fill before October 1, 2024. 30 tablet 0    amphetamine-dextroamphetamine (Adderall) 15 mg tablet Take 1 tablet (15 mg) by mouth once daily. 30 tablet 0    amphetamine-dextroamphetamine (Adderall) 30 mg tablet Take 1 tablet (30 mg) by mouth once daily. as directed Do not fill before September 1, 2024. 30 tablet 0    amphetamine-dextroamphetamine (Adderall) 30 mg tablet Take 1 tablet (30 mg) by mouth once daily. as directed Do not fill before October 1, 2024. 30 tablet 0    amphetamine-dextroamphetamine (Adderall) 30 mg tablet Take 1 tablet (30 mg) by mouth once daily. as directed 30 tablet 0    dutasteride (Avodart) 0.5 mg capsule Take 1 capsule (0.5 mg) by mouth once daily.      methylPREDNISolone (Medrol Dospak) 4 mg tablets Take as directed on package. 21 tablet 0     No current facility-administered medications on file prior to visit.        No Known Allergies      There is no immunization history on file for this patient.      Review of Systems  All pertinent positive symptoms are included in the history of present illness.  All other systems have been reviewed and are negative and noncontributory to this patient's current ailments.     Objective   /80 (BP Location:  Left arm, Patient Position: Sitting, BP Cuff Size: Large adult)   Pulse 107   Wt 108 kg (237 lb 9.6 oz)   SpO2 97%   BMI 33.14 kg/m²   BSA: 2.33 meters squared  No visits with results within 1 Month(s) from this visit.   Latest known visit with results is:   Lab on 02/09/2024   Component Date Value Ref Range Status    Prostate Specific AG 02/09/2024 0.30  <=4.00 ng/mL Final    Thyroid Stimulating Hormone 02/09/2024 2.14  0.44 - 3.98 mIU/L Final    Cholesterol 02/09/2024 141  0 - 199 mg/dL Final          Age      Desirable   Borderline High   High     0-19 Y     0 - 169       170 - 199     >/= 200    20-24 Y     0 - 189       190 - 224     >/= 225         >24 Y     0 - 199       200 - 239     >/= 240   **All ranges are based on fasting samples. Specific   therapeutic targets will vary based on patient-specific   cardiac risk.    Pediatric guidelines reference:Pediatrics 2011, 128(S5).Adult guidelines reference: NCEP ATPIII Guidelines,LIEN 2001, 258:2486-97    Venipuncture immediately after or during the administration of Metamizole may lead to falsely low results. Testing should be performed immediately prior to Metamizole dosing.    HDL-Cholesterol 02/09/2024 38.8  mg/dL Final      Age       Very Low   Low     Normal    High    0-19 Y    < 35      < 40     40-45     ----  20-24 Y    ----     < 40      >45      ----        >24 Y      ----     < 40     40-60      >60      Cholesterol/HDL Ratio 02/09/2024 3.6   Final      Ref Values  Desirable  < 3.4  High Risk  > 5.0    LDL Calculated 02/09/2024 84  <=99 mg/dL Final                                Near   Borderline      AGE      Desirable  Optimal    High     High     Very High     0-19 Y     0 - 109     ---    110-129   >/= 130     ----    20-24 Y     0 - 119     ---    120-159   >/= 160     ----      >24 Y     0 -  99   100-129  130-159   160-189     >/=190      VLDL 02/09/2024 19  0 - 40 mg/dL Final    Triglycerides 02/09/2024 93  0 - 149 mg/dL Final       Age          Desirable   Borderline High   High     Very High   0 D-90 D    19 - 174         ----         ----        ----  91 D- 9 Y     0 -  74        75 -  99     >/= 100      ----    10-19 Y     0 -  89        90 - 129     >/= 130      ----    20-24 Y     0 - 114       115 - 149     >/= 150      ----         >24 Y     0 - 149       150 - 199    200- 499    >/= 500    Venipuncture immediately after or during the administration of Metamizole may lead to falsely low results. Testing should be performed immediately prior to Metamizole dosing.    Non HDL Cholesterol 02/09/2024 102  0 - 149 mg/dL Final          Age       Desirable   Borderline High   High     Very High     0-19 Y     0 - 119       120 - 144     >/= 145    >/= 160    20-24 Y     0 - 149       150 - 189     >/= 190      ----         >24 Y    30 mg/dL above LDL Cholesterol goal      Glucose 02/09/2024 77  74 - 99 mg/dL Final    Sodium 02/09/2024 136  136 - 145 mmol/L Final    Potassium 02/09/2024 4.0  3.5 - 5.3 mmol/L Final    Chloride 02/09/2024 103  98 - 107 mmol/L Final    Bicarbonate 02/09/2024 26  21 - 32 mmol/L Final    Anion Gap 02/09/2024 11  10 - 20 mmol/L Final    Urea Nitrogen 02/09/2024 19  6 - 23 mg/dL Final    Creatinine 02/09/2024 0.83  0.50 - 1.30 mg/dL Final    eGFR 02/09/2024 >90  >60 mL/min/1.73m*2 Final    Calculations of estimated GFR are performed using the 2021 CKD-EPI Study Refit equation without the race variable for the IDMS-Traceable creatinine methods.  https://jasn.asnjournals.org/content/early/2021/09/22/ASN.8211759861    Calcium 02/09/2024 8.6  8.6 - 10.3 mg/dL Final    Albumin 02/09/2024 3.9  3.4 - 5.0 g/dL Final    Alkaline Phosphatase 02/09/2024 39  33 - 120 U/L Final    Total Protein 02/09/2024 6.5  6.4 - 8.2 g/dL Final    AST 02/09/2024 41 (H)  9 - 39 U/L Final    Bilirubin, Total 02/09/2024 0.4  0.0 - 1.2 mg/dL Final    ALT 02/09/2024 58 (H)  10 - 52 U/L Final    Patients treated with Sulfasalazine may generate falsely  decreased results for ALT.    WBC 02/09/2024 5.0  4.4 - 11.3 x10*3/uL Final    nRBC 02/09/2024 0.0  0.0 - 0.0 /100 WBCs Final    RBC 02/09/2024 5.67  4.50 - 5.90 x10*6/uL Final    Hemoglobin 02/09/2024 16.9  13.5 - 17.5 g/dL Final    Hematocrit 02/09/2024 51.1  41.0 - 52.0 % Final    MCV 02/09/2024 90  80 - 100 fL Final    MCH 02/09/2024 29.8  26.0 - 34.0 pg Final    MCHC 02/09/2024 33.1  32.0 - 36.0 g/dL Final    RDW 02/09/2024 13.4  11.5 - 14.5 % Final    Platelets 02/09/2024 241  150 - 450 x10*3/uL Final    Neutrophils % 02/09/2024 51.0  40.0 - 80.0 % Final    Immature Granulocytes %, Automated 02/09/2024 0.2  0.0 - 0.9 % Final    Immature Granulocyte Count (IG) includes promyelocytes, myelocytes and metamyelocytes but does not include bands. Percent differential counts (%) should be interpreted in the context of the absolute cell counts (cells/UL).    Lymphocytes % 02/09/2024 28.7  13.0 - 44.0 % Final    Monocytes % 02/09/2024 12.5  2.0 - 10.0 % Final    Eosinophils % 02/09/2024 6.2  0.0 - 6.0 % Final    Basophils % 02/09/2024 1.4  0.0 - 2.0 % Final    Neutrophils Absolute 02/09/2024 2.56  1.20 - 7.70 x10*3/uL Final    Percent differential counts (%) should be interpreted in the context of the absolute cell counts (cells/uL).    Immature Granulocytes Absolute, Au* 02/09/2024 0.01  0.00 - 0.70 x10*3/uL Final    Lymphocytes Absolute 02/09/2024 1.44  1.20 - 4.80 x10*3/uL Final    Monocytes Absolute 02/09/2024 0.63  0.10 - 1.00 x10*3/uL Final    Eosinophils Absolute 02/09/2024 0.31  0.00 - 0.70 x10*3/uL Final    Basophils Absolute 02/09/2024 0.07  0.00 - 0.10 x10*3/uL Final    Hemoglobin A1C 02/09/2024 5.1  see below % Final    Estimated Average Glucose 02/09/2024 100  Not Established mg/dL Final    Amphetamine Screen, Urine 02/09/2024 Presumptive Positive (A)  Presumptive Negative Final    CUTOFF LEVEL: 500 NG/ML   Cross-reactivity has been reported with high concentrations   of the following drugs: buproprion,  chloroquine, chlorpromazine,   ephedrine, mephentermine, fenfluramine, phentermine,   phenylpropanolamine, pseudoephedrine, and propranolol.    Barbiturate Screen, Urine 02/09/2024 Presumptive Negative  Presumptive Negative Final    CUTOFF LEVEL: 200 NG/ML    Benzodiazepines Screen, Urine 02/09/2024 Presumptive Negative  Presumptive Negative Final    CUTOFF LEVEL: 200 NG/ML    Cannabinoid Screen, Urine 02/09/2024 Presumptive Negative  Presumptive Negative Final    CUTOFF LEVEL: 50 NG/ML    Cocaine Metabolite Screen, Urine 02/09/2024 Presumptive Negative  Presumptive Negative Final    CUTOFF LEVEL: 150 NG/ML    Fentanyl Screen, Urine 02/09/2024 Presumptive Negative  Presumptive Negative Final    CUTOFF LEVEL: 5 NG/ML    Opiate Screen, Urine 02/09/2024 Presumptive Negative  Presumptive Negative Final    CUTOFF LEVEL: 300 NG/ML  The opiate screen does not detect fentanyl, meperidine, or   tramadol. Oxycodone is not consistently detected (refer to  Oxycodone Screen, Urine result).    Oxycodone Screen, Urine 02/09/2024 Presumptive Negative  Presumptive Negative Final    CUTOFF LEVEL: 100 NG/ML  This test will accurately detect both oxycodone and oxymorphone.    PCP Screen, Urine 02/09/2024 Presumptive Negative  Presumptive Negative Final    CUTOFF LEVEL:  25 NG/ML  Cross-reactivity has been reported with dextromethorphan.    Methamphetamine Quant, Ur 02/09/2024 <200  ng/mL Final    MDA, Urine 02/09/2024 <200  ng/mL Final    MDEA, Urine 02/09/2024 <200  ng/mL Final    Phentermine,Urine 02/09/2024 <200  ng/mL Final    Performed By: [facility]  [address] [place] [address]  : Tyrone Carvalho MD, PhD  CLIA Number: [id number]    Amphetamines,Urine 02/09/2024 2669  ng/mL Final    Comment:   Consistent with use of a drug containing amphetamine. May also   reflect metabolism of methamphetamine, when methamphetamine is   present.  Amphetamine and methamphetamine exist in d- and    l-isomeric forms.  These forms are not distinguished by this test.    Isomeric separation is available separately for an additional   charge.  INTERPRETIVE INFORMATION: Amphetamines, Urine,                             Quantitative    Methodology: Quantitative Liquid Chromatography-Tandem Mass   Spectrometry    Positive cutoff: 200 ng/mL unless specified below:  Amphetamine     50 ng/mL    For medical purposes only; not valid for forensic use.     The absence of expected drug(s) and/or drug metabolite(s) may   indicate non-compliance, inappropriate timing of specimen   collection relative to drug administration, poor drug absorption,   diluted/adulterated urine, or limitations of testing. The   concentration value must be greater than or equal to the cutoff to   be reported as positive.                            Interpretive questions should be directed   to the laboratory.    This test was developed and its performance characteristics   determined by [facility]. It has not been cleared or   approved by the [place] Food and Drug Administration. This test was   performed in a CLIA certified laboratory and is intended for   clinical purposes.    MDMA, Urine 02/09/2024 <200  ng/mL Final    Methamphetamine Quant, Ur 02/09/2024 <200  ng/mL Final    MDA, Urine 02/09/2024 <200  ng/mL Final    MDEA, Urine 02/09/2024 <200  ng/mL Final    Phentermine,Urine 02/09/2024 <200  ng/mL Final    Performed By: [facility]  [address] [place] [address]  : Tyrone Carvalho MD, PhD  CLIA Number: [id number]    Amphetamines,Urine 02/09/2024 2564  ng/mL Final    Comment:   Consistent with use of a drug containing amphetamine. May also   reflect metabolism of methamphetamine, when methamphetamine is   present.  Amphetamine and methamphetamine exist in d- and   l-isomeric forms.  These forms are not distinguished by this test.    Isomeric separation is available separately for an additional    charge.  INTERPRETIVE INFORMATION: Amphetamines, Urine,                             Quantitative    Methodology: Quantitative Liquid Chromatography-Tandem Mass   Spectrometry    Positive cutoff: 200 ng/mL unless specified below:  Amphetamine     50 ng/mL    For medical purposes only; not valid for forensic use.     The absence of expected drug(s) and/or drug metabolite(s) may   indicate non-compliance, inappropriate timing of specimen   collection relative to drug administration, poor drug absorption,   diluted/adulterated urine, or limitations of testing. The   concentration value must be greater than or equal to the cutoff to   be reported as positive.                            Interpretive questions should be directed   to the laboratory.    This test was developed and its performance characteristics   determined by [facility]. It has not been cleared or   approved by the [place] Food and Drug Administration. This test was   performed in a CLIA certified laboratory and is intended for   clinical purposes.    MDMA, Urine 02/09/2024 <200  ng/mL Final       Physical Exam  CONSTITUTIONAL - well nourished, well developed, looks like stated age, in no acute distress, not ill-appearing, and not tired appearing  SKIN - normal skin color and pigmentation, normal skin turgor without rash, lesions, or nodules visualized  HEAD - no trauma, normocephalic  EYES - normal external exam  CHEST -no distressed breathing, good effort  EXTREMITIES - no edema, no deformities  MSK - tenderness on palpation of plantar aspect of foot   NEUROLOGICAL - normal balance, normal motor, no ataxia  PSYCHIATRIC - alert, pleasant and cordial, age-appropriate      Assessment/Plan   1.  ADHD  Controlled, refills of both your Adderall 30 mg QAM and 15 mg QPM were sent to your pharmacy  No changes recommended at this time  UDS and CSA completed (2/9/2024).    We will follow-up in 3 months for continued care, February 2025       Thank you for  letting us be a part of your care team.  Please call the office if you have further questions or concerns regarding your care     Otherwise, please follow-up in 3 months for continued care and refills as stated above

## 2025-01-30 ENCOUNTER — APPOINTMENT (OUTPATIENT)
Dept: PRIMARY CARE | Facility: CLINIC | Age: 60
End: 2025-01-30
Payer: COMMERCIAL

## 2025-01-30 VITALS
OXYGEN SATURATION: 98 % | SYSTOLIC BLOOD PRESSURE: 132 MMHG | HEIGHT: 71 IN | BODY MASS INDEX: 33.1 KG/M2 | WEIGHT: 236.4 LBS | HEART RATE: 90 BPM | DIASTOLIC BLOOD PRESSURE: 80 MMHG

## 2025-01-30 DIAGNOSIS — Z00.00 ANNUAL PHYSICAL EXAM: Primary | ICD-10-CM

## 2025-01-30 DIAGNOSIS — F98.8 ATTENTION DEFICIT DISORDER (ADD) IN ADULT: ICD-10-CM

## 2025-01-30 DIAGNOSIS — F90.9 ATTENTION DEFICIT HYPERACTIVITY DISORDER (ADHD), UNSPECIFIED ADHD TYPE: ICD-10-CM

## 2025-01-30 LAB
POC APPEARANCE, URINE: CLEAR
POC BILIRUBIN, URINE: NEGATIVE
POC BLOOD, URINE: NEGATIVE
POC COLOR, URINE: YELLOW
POC GLUCOSE, URINE: NEGATIVE MG/DL
POC KETONES, URINE: NEGATIVE MG/DL
POC LEUKOCYTES, URINE: NEGATIVE
POC NITRITE,URINE: NEGATIVE
POC PH, URINE: 6.5 PH
POC PROTEIN, URINE: NEGATIVE MG/DL
POC SPECIFIC GRAVITY, URINE: 1.01
POC UROBILINOGEN, URINE: 0.2 EU/DL

## 2025-01-30 RX ORDER — DEXTROAMPHETAMINE SACCHARATE, AMPHETAMINE ASPARTATE, DEXTROAMPHETAMINE SULFATE AND AMPHETAMINE SULFATE 7.5; 7.5; 7.5; 7.5 MG/1; MG/1; MG/1; MG/1
30 TABLET ORAL DAILY
Qty: 30 TABLET | Refills: 0 | Status: SHIPPED | OUTPATIENT
Start: 2025-03-31 | End: 2025-04-30

## 2025-01-30 RX ORDER — DEXTROAMPHETAMINE SACCHARATE, AMPHETAMINE ASPARTATE, DEXTROAMPHETAMINE SULFATE AND AMPHETAMINE SULFATE 3.75; 3.75; 3.75; 3.75 MG/1; MG/1; MG/1; MG/1
15 TABLET ORAL DAILY
Qty: 30 TABLET | Refills: 0 | Status: SHIPPED | OUTPATIENT
Start: 2025-03-01 | End: 2025-03-31

## 2025-01-30 RX ORDER — DEXTROAMPHETAMINE SACCHARATE, AMPHETAMINE ASPARTATE, DEXTROAMPHETAMINE SULFATE AND AMPHETAMINE SULFATE 3.75; 3.75; 3.75; 3.75 MG/1; MG/1; MG/1; MG/1
15 TABLET ORAL DAILY
Qty: 30 TABLET | Refills: 0 | Status: SHIPPED | OUTPATIENT
Start: 2025-03-31 | End: 2025-04-30

## 2025-01-30 RX ORDER — DEXTROAMPHETAMINE SACCHARATE, AMPHETAMINE ASPARTATE, DEXTROAMPHETAMINE SULFATE AND AMPHETAMINE SULFATE 3.75; 3.75; 3.75; 3.75 MG/1; MG/1; MG/1; MG/1
15 TABLET ORAL DAILY
Qty: 30 TABLET | Refills: 0 | Status: SHIPPED | OUTPATIENT
Start: 2025-01-30 | End: 2025-03-01

## 2025-01-30 RX ORDER — DEXTROAMPHETAMINE SACCHARATE, AMPHETAMINE ASPARTATE, DEXTROAMPHETAMINE SULFATE AND AMPHETAMINE SULFATE 7.5; 7.5; 7.5; 7.5 MG/1; MG/1; MG/1; MG/1
30 TABLET ORAL DAILY
Qty: 30 TABLET | Refills: 0 | Status: SHIPPED | OUTPATIENT
Start: 2025-03-01 | End: 2025-03-31

## 2025-01-30 RX ORDER — DEXTROAMPHETAMINE SACCHARATE, AMPHETAMINE ASPARTATE, DEXTROAMPHETAMINE SULFATE AND AMPHETAMINE SULFATE 7.5; 7.5; 7.5; 7.5 MG/1; MG/1; MG/1; MG/1
30 TABLET ORAL DAILY
Qty: 30 TABLET | Refills: 0 | Status: SHIPPED | OUTPATIENT
Start: 2025-01-30 | End: 2025-03-01

## 2025-01-30 NOTE — PROGRESS NOTES
"Subjective   Reason for Visit: Raphael Cristina is an 59 y.o. male here for a Annual Physical visit.       HPI  1.  Physical exam.  Overall patient is doing well.  Denies any chest pain, shortness of breath or wheezing on exertion.   Immunizations: Declined any vaccines.  Colon Cancer Screening: No family history, colonoscopy 2021 with 10-year clearance  Diet: Regular well-balanced diet  Exercise: Exercise regularly at the gym every day  Tobacco: Denies use  EtOH: Rarely Socially   Review of Systems      All pertinent positive symptoms are included in the history of present illness.      2. ADHD   Controlled, taking Adderall 30 mg once daily in AM and 15 mg once daily in afternoon.  Reports good control on current regimen.   UDS was completed in 2024, willing to update today  CSA reviewed and signed (2/9/2024).   Denies chest pain, shortness of breath, palpitations, or dizziness.   Requesting refills    No Known Allergies      There is no immunization history on file for this patient.    Review of Systems  All pertinent positive symptoms are included in the history of present illness.    All other systems have been reviewed and are negative and noncontributory to this patient's current ailments.    Objective   Vitals:  /80 (BP Location: Left arm, Patient Position: Sitting, BP Cuff Size: Large adult)   Pulse 90   Ht 1.803 m (5' 11\")   Wt 107 kg (236 lb 6.4 oz)   SpO2 98%   BMI 32.97 kg/m²     No visits with results within 6 Month(s) from this visit.   Latest known visit with results is:   Lab on 02/09/2024   Component Date Value Ref Range Status    Prostate Specific AG 02/09/2024 0.30  <=4.00 ng/mL Final    Thyroid Stimulating Hormone 02/09/2024 2.14  0.44 - 3.98 mIU/L Final    Cholesterol 02/09/2024 141  0 - 199 mg/dL Final          Age      Desirable   Borderline High   High     0-19 Y     0 - 169       170 - 199     >/= 200    20-24 Y     0 - 189       190 - 224     >/= 225         >24 Y     0 - 199     "   200 - 239     >/= 240   **All ranges are based on fasting samples. Specific   therapeutic targets will vary based on patient-specific   cardiac risk.    Pediatric guidelines reference:Pediatrics 2011, 128(S5).Adult guidelines reference: NCEP ATPIII Guidelines,LIEN 2001, 258:2486-97    Venipuncture immediately after or during the administration of Metamizole may lead to falsely low results. Testing should be performed immediately prior to Metamizole dosing.    HDL-Cholesterol 02/09/2024 38.8  mg/dL Final      Age       Very Low   Low     Normal    High    0-19 Y    < 35      < 40     40-45     ----  20-24 Y    ----     < 40      >45      ----        >24 Y      ----     < 40     40-60      >60      Cholesterol/HDL Ratio 02/09/2024 3.6   Final      Ref Values  Desirable  < 3.4  High Risk  > 5.0    LDL Calculated 02/09/2024 84  <=99 mg/dL Final                                Near   Borderline      AGE      Desirable  Optimal    High     High     Very High     0-19 Y     0 - 109     ---    110-129   >/= 130     ----    20-24 Y     0 - 119     ---    120-159   >/= 160     ----      >24 Y     0 -  99   100-129  130-159   160-189     >/=190      VLDL 02/09/2024 19  0 - 40 mg/dL Final    Triglycerides 02/09/2024 93  0 - 149 mg/dL Final       Age         Desirable   Borderline High   High     Very High   0 D-90 D    19 - 174         ----         ----        ----  91 D- 9 Y     0 -  74        75 -  99     >/= 100      ----    10-19 Y     0 -  89        90 - 129     >/= 130      ----    20-24 Y     0 - 114       115 - 149     >/= 150      ----         >24 Y     0 - 149       150 - 199    200- 499    >/= 500    Venipuncture immediately after or during the administration of Metamizole may lead to falsely low results. Testing should be performed immediately prior to Metamizole dosing.    Non HDL Cholesterol 02/09/2024 102  0 - 149 mg/dL Final          Age       Desirable   Borderline High   High     Very High     0-19 Y     0 -  119       120 - 144     >/= 145    >/= 160    20-24 Y     0 - 149       150 - 189     >/= 190      ----         >24 Y    30 mg/dL above LDL Cholesterol goal      Glucose 02/09/2024 77  74 - 99 mg/dL Final    Sodium 02/09/2024 136  136 - 145 mmol/L Final    Potassium 02/09/2024 4.0  3.5 - 5.3 mmol/L Final    Chloride 02/09/2024 103  98 - 107 mmol/L Final    Bicarbonate 02/09/2024 26  21 - 32 mmol/L Final    Anion Gap 02/09/2024 11  10 - 20 mmol/L Final    Urea Nitrogen 02/09/2024 19  6 - 23 mg/dL Final    Creatinine 02/09/2024 0.83  0.50 - 1.30 mg/dL Final    eGFR 02/09/2024 >90  >60 mL/min/1.73m*2 Final    Calculations of estimated GFR are performed using the 2021 CKD-EPI Study Refit equation without the race variable for the IDMS-Traceable creatinine methods.  https://jasn.asnjournals.org/content/early/2021/09/22/ASN.9177522942    Calcium 02/09/2024 8.6  8.6 - 10.3 mg/dL Final    Albumin 02/09/2024 3.9  3.4 - 5.0 g/dL Final    Alkaline Phosphatase 02/09/2024 39  33 - 120 U/L Final    Total Protein 02/09/2024 6.5  6.4 - 8.2 g/dL Final    AST 02/09/2024 41 (H)  9 - 39 U/L Final    Bilirubin, Total 02/09/2024 0.4  0.0 - 1.2 mg/dL Final    ALT 02/09/2024 58 (H)  10 - 52 U/L Final    Patients treated with Sulfasalazine may generate falsely decreased results for ALT.    WBC 02/09/2024 5.0  4.4 - 11.3 x10*3/uL Final    nRBC 02/09/2024 0.0  0.0 - 0.0 /100 WBCs Final    RBC 02/09/2024 5.67  4.50 - 5.90 x10*6/uL Final    Hemoglobin 02/09/2024 16.9  13.5 - 17.5 g/dL Final    Hematocrit 02/09/2024 51.1  41.0 - 52.0 % Final    MCV 02/09/2024 90  80 - 100 fL Final    MCH 02/09/2024 29.8  26.0 - 34.0 pg Final    MCHC 02/09/2024 33.1  32.0 - 36.0 g/dL Final    RDW 02/09/2024 13.4  11.5 - 14.5 % Final    Platelets 02/09/2024 241  150 - 450 x10*3/uL Final    Neutrophils % 02/09/2024 51.0  40.0 - 80.0 % Final    Immature Granulocytes %, Automated 02/09/2024 0.2  0.0 - 0.9 % Final    Immature Granulocyte Count (IG) includes  promyelocytes, myelocytes and metamyelocytes but does not include bands. Percent differential counts (%) should be interpreted in the context of the absolute cell counts (cells/UL).    Lymphocytes % 02/09/2024 28.7  13.0 - 44.0 % Final    Monocytes % 02/09/2024 12.5  2.0 - 10.0 % Final    Eosinophils % 02/09/2024 6.2  0.0 - 6.0 % Final    Basophils % 02/09/2024 1.4  0.0 - 2.0 % Final    Neutrophils Absolute 02/09/2024 2.56  1.20 - 7.70 x10*3/uL Final    Percent differential counts (%) should be interpreted in the context of the absolute cell counts (cells/uL).    Immature Granulocytes Absolute, Au* 02/09/2024 0.01  0.00 - 0.70 x10*3/uL Final    Lymphocytes Absolute 02/09/2024 1.44  1.20 - 4.80 x10*3/uL Final    Monocytes Absolute 02/09/2024 0.63  0.10 - 1.00 x10*3/uL Final    Eosinophils Absolute 02/09/2024 0.31  0.00 - 0.70 x10*3/uL Final    Basophils Absolute 02/09/2024 0.07  0.00 - 0.10 x10*3/uL Final    Hemoglobin A1C 02/09/2024 5.1  see below % Final    Estimated Average Glucose 02/09/2024 100  Not Established mg/dL Final    Amphetamine Screen, Urine 02/09/2024 Presumptive Positive (A)  Presumptive Negative Final    CUTOFF LEVEL: 500 NG/ML   Cross-reactivity has been reported with high concentrations   of the following drugs: buproprion, chloroquine, chlorpromazine,   ephedrine, mephentermine, fenfluramine, phentermine,   phenylpropanolamine, pseudoephedrine, and propranolol.    Barbiturate Screen, Urine 02/09/2024 Presumptive Negative  Presumptive Negative Final    CUTOFF LEVEL: 200 NG/ML    Benzodiazepines Screen, Urine 02/09/2024 Presumptive Negative  Presumptive Negative Final    CUTOFF LEVEL: 200 NG/ML    Cannabinoid Screen, Urine 02/09/2024 Presumptive Negative  Presumptive Negative Final    CUTOFF LEVEL: 50 NG/ML    Cocaine Metabolite Screen, Urine 02/09/2024 Presumptive Negative  Presumptive Negative Final    CUTOFF LEVEL: 150 NG/ML    Fentanyl Screen, Urine 02/09/2024 Presumptive Negative  Presumptive  Negative Final    CUTOFF LEVEL: 5 NG/ML    Opiate Screen, Urine 02/09/2024 Presumptive Negative  Presumptive Negative Final    CUTOFF LEVEL: 300 NG/ML  The opiate screen does not detect fentanyl, meperidine, or   tramadol. Oxycodone is not consistently detected (refer to  Oxycodone Screen, Urine result).    Oxycodone Screen, Urine 02/09/2024 Presumptive Negative  Presumptive Negative Final    CUTOFF LEVEL: 100 NG/ML  This test will accurately detect both oxycodone and oxymorphone.    PCP Screen, Urine 02/09/2024 Presumptive Negative  Presumptive Negative Final    CUTOFF LEVEL:  25 NG/ML  Cross-reactivity has been reported with dextromethorphan.    Methamphetamine Quant, Ur 02/09/2024 <200  ng/mL Final    MDA, Urine 02/09/2024 <200  ng/mL Final    MDEA, Urine 02/09/2024 <200  ng/mL Final    Phentermine,Urine 02/09/2024 <200  ng/mL Final    Performed By: [facility]  [address] [place] [address]  : Tyrone Carvalho MD, PhD  CLIA Number: [id number]    Amphetamines,Urine 02/09/2024 2669  ng/mL Final    Comment:   Consistent with use of a drug containing amphetamine. May also   reflect metabolism of methamphetamine, when methamphetamine is   present.  Amphetamine and methamphetamine exist in d- and   l-isomeric forms.  These forms are not distinguished by this test.    Isomeric separation is available separately for an additional   charge.  INTERPRETIVE INFORMATION: Amphetamines, Urine,                             Quantitative    Methodology: Quantitative Liquid Chromatography-Tandem Mass   Spectrometry    Positive cutoff: 200 ng/mL unless specified below:  Amphetamine     50 ng/mL    For medical purposes only; not valid for forensic use.     The absence of expected drug(s) and/or drug metabolite(s) may   indicate non-compliance, inappropriate timing of specimen   collection relative to drug administration, poor drug absorption,   diluted/adulterated urine, or limitations of  testing. The   concentration value must be greater than or equal to the cutoff to   be reported as positive.                            Interpretive questions should be directed   to the laboratory.    This test was developed and its performance characteristics   determined by [facility]. It has not been cleared or   approved by the [place] Food and Drug Administration. This test was   performed in a CLIA certified laboratory and is intended for   clinical purposes.    MDMA, Urine 02/09/2024 <200  ng/mL Final    Methamphetamine Quant, Ur 02/09/2024 <200  ng/mL Final    MDA, Urine 02/09/2024 <200  ng/mL Final    MDEA, Urine 02/09/2024 <200  ng/mL Final    Phentermine,Urine 02/09/2024 <200  ng/mL Final    Performed By: [facility]  [address] [place] [id number]  : Tyrone Carvalho MD, PhD  CLIA Number: [id number]    Amphetamines,Urine 02/09/2024 2564  ng/mL Final    Comment:   Consistent with use of a drug containing amphetamine. May also   reflect metabolism of methamphetamine, when methamphetamine is   present.  Amphetamine and methamphetamine exist in d- and   l-isomeric forms.  These forms are not distinguished by this test.    Isomeric separation is available separately for an additional   charge.  INTERPRETIVE INFORMATION: Amphetamines, Urine,                             Quantitative    Methodology: Quantitative Liquid Chromatography-Tandem Mass   Spectrometry    Positive cutoff: 200 ng/mL unless specified below:  Amphetamine     50 ng/mL    For medical purposes only; not valid for forensic use.     The absence of expected drug(s) and/or drug metabolite(s) may   indicate non-compliance, inappropriate timing of specimen   collection relative to drug administration, poor drug absorption,   diluted/adulterated urine, or limitations of testing. The   concentration value must be greater than or equal to the cutoff to   be reported as positive.                             Interpretive questions should be directed   to the laboratory.    This test was developed and its performance characteristics   determined by [email or web address]. It has not been cleared or   approved by the [place] Food and Drug Administration. This test was   performed in a CLIA certified laboratory and is intended for   clinical purposes.    MDMA, Urine 02/09/2024 <200  ng/mL Final       Physical Exam  CONSTITUTIONAL - well nourished, well developed, looks like stated age, in no acute distress, not ill-appearing, and not tired appearing  SKIN - normal skin color and pigmentation, normal skin turgor without rash, lesions, or nodules visualized  HEAD - no trauma, normocephalic  NECK - supple without rigidity, no neck mass was observed, no thyromegaly or thyroid nodules  CHEST - clear to auscultation, no wheezing, no crackles and no rales, good effort  CARDIAC - regular rate and regular rhythm, no skipped beats, no murmur  ABDOMEN - no organomegaly, soft, nontender, nondistended, normal bowel sounds, no guarding/rebound/rigidity  EXTREMITIES - no edema, no deformities  NEUROLOGICAL - normal gait, normal balance, normal motor, no ataxia  PSYCHIATRIC - alert, pleasant and cordial, age-appropriate  IMMUNOLOGIC - no cervical lymphadenopathy    Assessment/Plan   1.  Physical exam.  Complete history and physical examination was performed  EKG reveals sinus rhythm, noted incomplete right bundle branch block and left axis, unchanged from previous  Requisition for fasting lab work placed in the chart  We will notify of test results once available and make treatment recommendations accordingly    2.ADHD   Controlled, refills of both your Adderall 30 mg QAM and 15 mg QPM were sent to your pharmacy  No changes recommended at this time  UDS completed today  CSA will be done at your next visit

## 2025-01-30 NOTE — ASSESSMENT & PLAN NOTE
Controlled, refills of both your Adderall 30 mg QAM and 15 mg QPM were sent to your pharmacy  No changes recommended at this time  UDS and CSA completed (2/9/2024).

## 2025-01-31 LAB
ALBUMIN SERPL-MCNC: 4.2 G/DL (ref 3.6–5.1)
ALP SERPL-CCNC: 56 U/L (ref 35–144)
ALT SERPL-CCNC: 38 U/L (ref 9–46)
ANION GAP SERPL CALCULATED.4IONS-SCNC: 6 MMOL/L (CALC) (ref 7–17)
AST SERPL-CCNC: 28 U/L (ref 10–35)
BASOPHILS # BLD AUTO: 79 CELLS/UL (ref 0–200)
BASOPHILS NFR BLD AUTO: 1.1 %
BILIRUB SERPL-MCNC: 0.5 MG/DL (ref 0.2–1.2)
BUN SERPL-MCNC: 19 MG/DL (ref 7–25)
CALCIUM SERPL-MCNC: 8.6 MG/DL (ref 8.6–10.3)
CHLORIDE SERPL-SCNC: 102 MMOL/L (ref 98–110)
CHOLEST SERPL-MCNC: 173 MG/DL
CHOLEST/HDLC SERPL: 2.9 (CALC)
CO2 SERPL-SCNC: 33 MMOL/L (ref 20–32)
CREAT SERPL-MCNC: 0.85 MG/DL (ref 0.7–1.3)
EGFRCR SERPLBLD CKD-EPI 2021: 101 ML/MIN/1.73M2
EOSINOPHIL # BLD AUTO: 202 CELLS/UL (ref 15–500)
EOSINOPHIL NFR BLD AUTO: 2.8 %
ERYTHROCYTE [DISTWIDTH] IN BLOOD BY AUTOMATED COUNT: 14.2 % (ref 11–15)
EST. AVERAGE GLUCOSE BLD GHB EST-MCNC: 108 MG/DL
EST. AVERAGE GLUCOSE BLD GHB EST-SCNC: 6 MMOL/L
GLUCOSE SERPL-MCNC: 80 MG/DL (ref 65–99)
HBA1C MFR BLD: 5.4 % OF TOTAL HGB
HCT VFR BLD AUTO: 52.1 % (ref 38.5–50)
HDLC SERPL-MCNC: 59 MG/DL
HGB BLD-MCNC: 17.3 G/DL (ref 13.2–17.1)
LDLC SERPL CALC-MCNC: 100 MG/DL (CALC)
LYMPHOCYTES # BLD AUTO: 1699 CELLS/UL (ref 850–3900)
LYMPHOCYTES NFR BLD AUTO: 23.6 %
MCH RBC QN AUTO: 29.3 PG (ref 27–33)
MCHC RBC AUTO-ENTMCNC: 33.2 G/DL (ref 32–36)
MCV RBC AUTO: 88.3 FL (ref 80–100)
MONOCYTES # BLD AUTO: 749 CELLS/UL (ref 200–950)
MONOCYTES NFR BLD AUTO: 10.4 %
NEUTROPHILS # BLD AUTO: 4471 CELLS/UL (ref 1500–7800)
NEUTROPHILS NFR BLD AUTO: 62.1 %
NONHDLC SERPL-MCNC: 114 MG/DL (CALC)
PLATELET # BLD AUTO: 207 THOUSAND/UL (ref 140–400)
PMV BLD REES-ECKER: 10.4 FL (ref 7.5–12.5)
POTASSIUM SERPL-SCNC: 4.5 MMOL/L (ref 3.5–5.3)
PROT SERPL-MCNC: 6.7 G/DL (ref 6.1–8.1)
PSA SERPL-MCNC: 0.39 NG/ML
RBC # BLD AUTO: 5.9 MILLION/UL (ref 4.2–5.8)
SODIUM SERPL-SCNC: 141 MMOL/L (ref 135–146)
TRIGL SERPL-MCNC: 49 MG/DL
TSH SERPL-ACNC: 2.27 MIU/L (ref 0.4–4.5)
WBC # BLD AUTO: 7.2 THOUSAND/UL (ref 3.8–10.8)

## 2025-01-31 NOTE — RESULT ENCOUNTER NOTE
Cholesterol 173, HDL 59, , triglycerides 49    Sugar, kidneys, liver, electrolytes are all within normal limits

## 2025-01-31 NOTE — RESULT ENCOUNTER NOTE
Complete blood cell count does show a slightly elevated hemoglobin/hematocrit 17.3 and 52.1 respectively    I would recommend that we repeat this within the next week or 2  This could easily be increased due to any smoking, testosterone, or other supplements    Prostate within normal limits alongside a normal thyroid    Hemoglobin A1c well within normal limits    We are waiting for other results at this time

## 2025-02-01 LAB
AMPHET UR-MCNC: 1850 NG/ML
AMPHET UR-MCNC: NORMAL NG/ML
AMPHETAMINES UR QL: POSITIVE NG/ML
BARBITURATES UR QL: NEGATIVE NG/ML
BENZODIAZ UR QL: NEGATIVE NG/ML
BZE UR QL: NEGATIVE NG/ML
CREAT UR-MCNC: 180.8 MG/DL
DRUG SCREEN COMMENT UR-IMP: ABNORMAL
MDA UR-MCNC: NORMAL UG/ML
MDEA UR-MCNC: NORMAL NG/ML
MDMA UR-MCNC: NORMAL NG/ML
METHADONE UR QL: NEGATIVE NG/ML
METHAMPHET UR-MCNC: NEGATIVE NG/ML
METHAMPHET UR-MCNC: NORMAL UG/ML
OPIATES UR QL: NEGATIVE NG/ML
OXIDANTS UR QL: NEGATIVE MCG/ML
OXYCODONE UR QL: NEGATIVE NG/ML
PCP UR QL: NEGATIVE NG/ML
PH UR: 6.2 [PH] (ref 4.5–9)
PHENTERMINE UR-MCNC: NORMAL UG/ML
QUEST NOTES AND COMMENTS: ABNORMAL
THC UR QL: NEGATIVE NG/ML

## 2025-02-03 LAB
AMPHET UR-MCNC: 1850 NG/ML
AMPHET UR-MCNC: 2011 NG/ML
AMPHETAMINES UR QL: POSITIVE NG/ML
BARBITURATES UR QL: NEGATIVE NG/ML
BENZODIAZ UR QL: NEGATIVE NG/ML
BZE UR QL: NEGATIVE NG/ML
CREAT UR-MCNC: 180.8 MG/DL
DRUG SCREEN COMMENT UR-IMP: ABNORMAL
MDA UR-MCNC: NEGATIVE NG/ML
MDEA UR-MCNC: NEGATIVE NG/ML
MDMA UR-MCNC: NEGATIVE NG/ML
METHADONE UR QL: NEGATIVE NG/ML
METHAMPHET UR-MCNC: NEGATIVE NG/ML
METHAMPHET UR-MCNC: NEGATIVE NG/ML
OPIATES UR QL: NEGATIVE NG/ML
OXIDANTS UR QL: NEGATIVE MCG/ML
OXYCODONE UR QL: NEGATIVE NG/ML
PCP UR QL: NEGATIVE NG/ML
PH UR: 6.2 [PH] (ref 4.5–9)
PHENTERMINE UR-MCNC: NEGATIVE NG/ML
QUEST NOTES AND COMMENTS: ABNORMAL
THC UR QL: NEGATIVE NG/ML

## 2025-05-02 ENCOUNTER — APPOINTMENT (OUTPATIENT)
Dept: PRIMARY CARE | Facility: CLINIC | Age: 60
End: 2025-05-02
Payer: COMMERCIAL

## 2025-05-14 ENCOUNTER — APPOINTMENT (OUTPATIENT)
Dept: PRIMARY CARE | Facility: CLINIC | Age: 60
End: 2025-05-14
Payer: COMMERCIAL

## 2025-05-14 VITALS
BODY MASS INDEX: 33.19 KG/M2 | SYSTOLIC BLOOD PRESSURE: 140 MMHG | HEART RATE: 104 BPM | WEIGHT: 238 LBS | OXYGEN SATURATION: 98 % | DIASTOLIC BLOOD PRESSURE: 82 MMHG

## 2025-05-14 DIAGNOSIS — L65.9 HAIR LOSS: Primary | ICD-10-CM

## 2025-05-14 DIAGNOSIS — F98.8 ATTENTION DEFICIT DISORDER (ADD) IN ADULT: ICD-10-CM

## 2025-05-14 PROCEDURE — 99214 OFFICE O/P EST MOD 30 MIN: CPT | Performed by: STUDENT IN AN ORGANIZED HEALTH CARE EDUCATION/TRAINING PROGRAM

## 2025-05-14 PROCEDURE — 1036F TOBACCO NON-USER: CPT | Performed by: STUDENT IN AN ORGANIZED HEALTH CARE EDUCATION/TRAINING PROGRAM

## 2025-05-14 RX ORDER — DEXTROAMPHETAMINE SACCHARATE, AMPHETAMINE ASPARTATE, DEXTROAMPHETAMINE SULFATE AND AMPHETAMINE SULFATE 3.75; 3.75; 3.75; 3.75 MG/1; MG/1; MG/1; MG/1
15 TABLET ORAL DAILY
Qty: 30 TABLET | Refills: 0 | Status: SHIPPED | OUTPATIENT
Start: 2025-05-14 | End: 2025-06-13

## 2025-05-14 RX ORDER — MINOXIDIL 2.5 MG/1
2.5 TABLET ORAL DAILY
Qty: 90 TABLET | Refills: 1 | Status: SHIPPED | OUTPATIENT
Start: 2025-05-14

## 2025-05-14 RX ORDER — DEXTROAMPHETAMINE SACCHARATE, AMPHETAMINE ASPARTATE, DEXTROAMPHETAMINE SULFATE AND AMPHETAMINE SULFATE 3.75; 3.75; 3.75; 3.75 MG/1; MG/1; MG/1; MG/1
15 TABLET ORAL DAILY
Qty: 30 TABLET | Refills: 0 | Status: SHIPPED | OUTPATIENT
Start: 2025-07-13 | End: 2025-08-12

## 2025-05-14 RX ORDER — DEXTROAMPHETAMINE SACCHARATE, AMPHETAMINE ASPARTATE, DEXTROAMPHETAMINE SULFATE AND AMPHETAMINE SULFATE 7.5; 7.5; 7.5; 7.5 MG/1; MG/1; MG/1; MG/1
30 TABLET ORAL DAILY
Qty: 30 TABLET | Refills: 0 | Status: SHIPPED | OUTPATIENT
Start: 2025-06-13 | End: 2025-07-13

## 2025-05-14 RX ORDER — DEXTROAMPHETAMINE SACCHARATE, AMPHETAMINE ASPARTATE, DEXTROAMPHETAMINE SULFATE AND AMPHETAMINE SULFATE 7.5; 7.5; 7.5; 7.5 MG/1; MG/1; MG/1; MG/1
30 TABLET ORAL DAILY
Qty: 30 TABLET | Refills: 0 | Status: SHIPPED | OUTPATIENT
Start: 2025-05-14 | End: 2025-06-13

## 2025-05-14 RX ORDER — DEXTROAMPHETAMINE SACCHARATE, AMPHETAMINE ASPARTATE, DEXTROAMPHETAMINE SULFATE AND AMPHETAMINE SULFATE 3.75; 3.75; 3.75; 3.75 MG/1; MG/1; MG/1; MG/1
15 TABLET ORAL DAILY
Qty: 30 TABLET | Refills: 0 | Status: SHIPPED | OUTPATIENT
Start: 2025-06-13 | End: 2025-07-13

## 2025-05-14 RX ORDER — DEXTROAMPHETAMINE SACCHARATE, AMPHETAMINE ASPARTATE, DEXTROAMPHETAMINE SULFATE AND AMPHETAMINE SULFATE 7.5; 7.5; 7.5; 7.5 MG/1; MG/1; MG/1; MG/1
30 TABLET ORAL DAILY
Qty: 30 TABLET | Refills: 0 | Status: SHIPPED | OUTPATIENT
Start: 2025-07-13 | End: 2025-08-12

## 2025-05-14 NOTE — PROGRESS NOTES
Subjective   Patient ID: Raphael Cristina is a 59 y.o. male who presents for ADHD.    HPI   ADHD   Controlled, taking Adderall 30 mg once daily in AM and 15 mg once daily in afternoon.  Reports good control on current regimen.   UDS completed on 01/2025.   CSA needed.   Denies chest pain, shortness of breath, palpitations, or dizziness.   Requesting refills    2.  Hair loss  Patient has been noticing increased signs/symptoms of hair loss  Continues to take finasteride orally  Now wondering if oral minoxidil would be also helpful  Good fine something online that stated that this could help  Asking to discuss this further at today's visit    Review of Systems  All pertinent positive symptoms are included in the history of present illness.    All other systems have been reviewed and are negative and noncontributory to this patient's current ailments.    Objective   /82 (BP Location: Left arm, Patient Position: Sitting, BP Cuff Size: Large adult)   Pulse 104   Wt 108 kg (238 lb)   SpO2 98%   BMI 33.19 kg/m²     Physical Exam  CONSTITUTIONAL - well nourished, well developed, looks like stated age, in no acute distress, not ill-appearing, and not tired appearing  SKIN - normal skin color and pigmentation, normal skin turgor without rash, lesions, or nodules visualized  HEAD - no trauma, normocephalic  EYES - normal external exam  CHEST -no distressed breathing, good effort  EXTREMITIES - no edema, no deformities  NEUROLOGICAL - normal balance, normal motor, no ataxia  PSYCHIATRIC - alert, pleasant and cordial, age-appropriate    Assessment/Plan   1. ADHD   Controlled, refills of both your Adderall 30 mg QAM and 15 mg QPM were sent to your pharmacy  No changes recommended at this time  UDS completed earlier this year  CSA was completed after today's visit    2.  Hair loss  We did have a discussion about hair loss and taking oral minoxidil  I did start at 2.5 mg daily and I recommend you monitor blood pressure  while taking this  You can take this along finasteride as well and the two pills should not to fully interact being at these low dosages

## 2025-07-30 ENCOUNTER — APPOINTMENT (OUTPATIENT)
Dept: PRIMARY CARE | Facility: CLINIC | Age: 60
End: 2025-07-30
Payer: COMMERCIAL

## 2025-07-30 VITALS
HEART RATE: 91 BPM | DIASTOLIC BLOOD PRESSURE: 80 MMHG | SYSTOLIC BLOOD PRESSURE: 138 MMHG | OXYGEN SATURATION: 98 % | BODY MASS INDEX: 33.61 KG/M2 | WEIGHT: 241 LBS

## 2025-07-30 DIAGNOSIS — F98.8 ATTENTION DEFICIT DISORDER (ADD) IN ADULT: ICD-10-CM

## 2025-07-30 DIAGNOSIS — L65.9 HAIR LOSS: ICD-10-CM

## 2025-07-30 PROCEDURE — 1036F TOBACCO NON-USER: CPT | Performed by: STUDENT IN AN ORGANIZED HEALTH CARE EDUCATION/TRAINING PROGRAM

## 2025-07-30 PROCEDURE — 99214 OFFICE O/P EST MOD 30 MIN: CPT | Performed by: STUDENT IN AN ORGANIZED HEALTH CARE EDUCATION/TRAINING PROGRAM

## 2025-07-30 RX ORDER — DEXTROAMPHETAMINE SACCHARATE, AMPHETAMINE ASPARTATE, DEXTROAMPHETAMINE SULFATE AND AMPHETAMINE SULFATE 7.5; 7.5; 7.5; 7.5 MG/1; MG/1; MG/1; MG/1
30 TABLET ORAL DAILY
Qty: 30 TABLET | Refills: 0 | Status: SHIPPED | OUTPATIENT
Start: 2025-08-29 | End: 2025-09-28

## 2025-07-30 RX ORDER — DEXTROAMPHETAMINE SACCHARATE, AMPHETAMINE ASPARTATE, DEXTROAMPHETAMINE SULFATE AND AMPHETAMINE SULFATE 7.5; 7.5; 7.5; 7.5 MG/1; MG/1; MG/1; MG/1
30 TABLET ORAL DAILY
Qty: 30 TABLET | Refills: 0 | Status: SHIPPED | OUTPATIENT
Start: 2025-07-30 | End: 2025-08-29

## 2025-07-30 RX ORDER — MINOXIDIL 2.5 MG/1
2.5 TABLET ORAL DAILY
Qty: 90 TABLET | Refills: 1 | Status: SHIPPED | OUTPATIENT
Start: 2025-07-30

## 2025-07-30 RX ORDER — DEXTROAMPHETAMINE SACCHARATE, AMPHETAMINE ASPARTATE, DEXTROAMPHETAMINE SULFATE AND AMPHETAMINE SULFATE 3.75; 3.75; 3.75; 3.75 MG/1; MG/1; MG/1; MG/1
15 TABLET ORAL DAILY
Qty: 30 TABLET | Refills: 0 | Status: SHIPPED | OUTPATIENT
Start: 2025-09-28 | End: 2025-10-28

## 2025-07-30 RX ORDER — DEXTROAMPHETAMINE SACCHARATE, AMPHETAMINE ASPARTATE, DEXTROAMPHETAMINE SULFATE AND AMPHETAMINE SULFATE 3.75; 3.75; 3.75; 3.75 MG/1; MG/1; MG/1; MG/1
15 TABLET ORAL DAILY
Qty: 30 TABLET | Refills: 0 | Status: SHIPPED | OUTPATIENT
Start: 2025-08-29 | End: 2025-09-28

## 2025-07-30 RX ORDER — DEXTROAMPHETAMINE SACCHARATE, AMPHETAMINE ASPARTATE, DEXTROAMPHETAMINE SULFATE AND AMPHETAMINE SULFATE 7.5; 7.5; 7.5; 7.5 MG/1; MG/1; MG/1; MG/1
30 TABLET ORAL DAILY
Qty: 30 TABLET | Refills: 0 | Status: SHIPPED | OUTPATIENT
Start: 2025-09-28 | End: 2025-10-28

## 2025-07-30 RX ORDER — DEXTROAMPHETAMINE SACCHARATE, AMPHETAMINE ASPARTATE, DEXTROAMPHETAMINE SULFATE AND AMPHETAMINE SULFATE 3.75; 3.75; 3.75; 3.75 MG/1; MG/1; MG/1; MG/1
15 TABLET ORAL DAILY
Qty: 30 TABLET | Refills: 0 | Status: SHIPPED | OUTPATIENT
Start: 2025-07-30 | End: 2025-08-29

## 2025-07-30 NOTE — PROGRESS NOTES
Subjective   Patient ID: Raphael Cristina is a 59 y.o. male who presents for ADHD.    HPI   ADHD   Controlled, taking Adderall 30 mg once daily in AM and 15 mg once daily in afternoon.  Reports good control on current regimen.   UDS completed on 01/2025.   Denies any issues with the medication  Requesting refills    2.  Hair loss  Continues to take finasteride as well as now oral minoxidil to help with hair loss  Feels well and requesting refills    Review of Systems  All pertinent positive symptoms are included in the history of present illness.    All other systems have been reviewed and are negative and noncontributory to this patient's current ailments.    Objective   /80 (BP Location: Left arm, Patient Position: Sitting, BP Cuff Size: Large adult)   Pulse 91   Wt 109 kg (241 lb)   SpO2 98%   BMI 33.61 kg/m²     Physical Exam  CONSTITUTIONAL - well nourished, well developed, looks like stated age, in no acute distress, not ill-appearing, and not tired appearing  SKIN - normal skin color and pigmentation, normal skin turgor without rash, lesions, or nodules visualized  HEAD - no trauma, normocephalic  EYES - normal external exam  CHEST -no distressed breathing, good effort  EXTREMITIES - no edema, no deformities  NEUROLOGICAL - normal balance, normal motor, no ataxia  PSYCHIATRIC - alert, pleasant and cordial, age-appropriate    Assessment/Plan   1. ADHD   Controlled, refills of both your Adderall 30 mg QAM and 15 mg QPM were sent to your pharmacy  No changes recommended at this time  UDS completed earlier this year      2.  Hair loss  Continue all medications as currently prescribed  Refills placed in your chart